# Patient Record
Sex: FEMALE | Race: WHITE | NOT HISPANIC OR LATINO | Employment: UNEMPLOYED | URBAN - NONMETROPOLITAN AREA
[De-identification: names, ages, dates, MRNs, and addresses within clinical notes are randomized per-mention and may not be internally consistent; named-entity substitution may affect disease eponyms.]

---

## 2017-01-26 ENCOUNTER — HISTORY (OUTPATIENT)
Dept: FAMILY MEDICINE | Facility: OTHER | Age: 37
End: 2017-01-26

## 2017-01-27 ENCOUNTER — OFFICE VISIT - GICH (OUTPATIENT)
Dept: FAMILY MEDICINE | Facility: OTHER | Age: 37
End: 2017-01-27

## 2017-01-27 ENCOUNTER — HISTORY (OUTPATIENT)
Dept: FAMILY MEDICINE | Facility: OTHER | Age: 37
End: 2017-01-27

## 2017-01-27 DIAGNOSIS — D69.6 THROMBOCYTOPENIA (H): ICD-10-CM

## 2017-01-27 LAB
ABSOLUTE BASOPHILS - HISTORICAL: 0 THOU/CU MM
ABSOLUTE EOSINOPHILS - HISTORICAL: 0 THOU/CU MM
ABSOLUTE LYMPHOCYTES - HISTORICAL: 1.4 THOU/CU MM (ref 0.9–2.9)
ABSOLUTE MONOCYTES - HISTORICAL: 0.4 THOU/CU MM
ABSOLUTE NEUTROPHILS - HISTORICAL: 3.1 THOU/CU MM (ref 1.7–7)
BASOPHILS # BLD AUTO: 0.6 %
EOSINOPHIL NFR BLD AUTO: 0.9 %
ERYTHROCYTE [DISTWIDTH] IN BLOOD BY AUTOMATED COUNT: 11 % (ref 11.5–15.5)
HCT VFR BLD AUTO: 44.1 % (ref 33–51)
HEMOGLOBIN: 14.5 G/DL (ref 12–16)
LYMPHOCYTES NFR BLD AUTO: 27.7 % (ref 20–44)
MCH RBC QN AUTO: 32 PG (ref 26–34)
MCHC RBC AUTO-ENTMCNC: 32.8 G/DL (ref 32–36)
MCV RBC AUTO: 97 FL (ref 80–100)
MONOCYTES NFR BLD AUTO: 7.5 %
NEUTROPHILS NFR BLD AUTO: 63.4 % (ref 42–72)
PLATELET # BLD AUTO: 127 THOU/CU MM (ref 140–440)
PMV BLD: 13.9 FL (ref 6.5–11)
RED BLOOD COUNT - HISTORICAL: 4.53 MIL/CU MM (ref 4–5.2)
WHITE BLOOD COUNT - HISTORICAL: 4.9 THOU/CU MM (ref 4.5–11)

## 2017-01-27 ASSESSMENT — PATIENT HEALTH QUESTIONNAIRE - PHQ9: SUM OF ALL RESPONSES TO PHQ QUESTIONS 1-9: 0

## 2017-02-07 LAB — HPV RESULTS - HISTORICAL: NEGATIVE

## 2017-05-22 ENCOUNTER — OFFICE VISIT - GICH (OUTPATIENT)
Dept: FAMILY MEDICINE | Facility: OTHER | Age: 37
End: 2017-05-22

## 2017-05-22 ENCOUNTER — HISTORY (OUTPATIENT)
Dept: FAMILY MEDICINE | Facility: OTHER | Age: 37
End: 2017-05-22

## 2017-05-22 DIAGNOSIS — R22.2 LOCALIZED SWELLING, MASS AND LUMP, TRUNK: ICD-10-CM

## 2017-05-24 ENCOUNTER — HOSPITAL ENCOUNTER (OUTPATIENT)
Dept: RADIOLOGY | Facility: OTHER | Age: 37
End: 2017-05-24
Attending: FAMILY MEDICINE

## 2017-05-24 ENCOUNTER — AMBULATORY - GICH (OUTPATIENT)
Dept: FAMILY MEDICINE | Facility: OTHER | Age: 37
End: 2017-05-24

## 2017-05-24 DIAGNOSIS — R22.2 LOCALIZED SWELLING, MASS AND LUMP, TRUNK: ICD-10-CM

## 2017-05-24 DIAGNOSIS — K43.9 VENTRAL HERNIA WITHOUT OBSTRUCTION OR GANGRENE: ICD-10-CM

## 2017-06-09 ENCOUNTER — OFFICE VISIT - GICH (OUTPATIENT)
Dept: SURGERY | Facility: OTHER | Age: 37
End: 2017-06-09

## 2017-06-09 ENCOUNTER — HISTORY (OUTPATIENT)
Dept: SURGERY | Facility: OTHER | Age: 37
End: 2017-06-09

## 2017-06-09 DIAGNOSIS — K43.9 VENTRAL HERNIA WITHOUT OBSTRUCTION OR GANGRENE: ICD-10-CM

## 2017-11-10 ENCOUNTER — COMMUNICATION - GICH (OUTPATIENT)
Dept: SURGERY | Facility: OTHER | Age: 37
End: 2017-11-10

## 2017-11-13 ENCOUNTER — COMMUNICATION - GICH (OUTPATIENT)
Dept: SURGERY | Facility: OTHER | Age: 37
End: 2017-11-13

## 2017-12-22 ENCOUNTER — SURGERY (OUTPATIENT)
Dept: SURGERY | Facility: OTHER | Age: 37
End: 2017-12-22

## 2017-12-22 ENCOUNTER — HOSPITAL ENCOUNTER (OUTPATIENT)
Dept: SURGERY | Facility: OTHER | Age: 37
Discharge: HOME | End: 2017-12-22
Attending: SURGERY | Admitting: SURGERY

## 2017-12-22 ENCOUNTER — HISTORY (OUTPATIENT)
Dept: SURGERY | Facility: OTHER | Age: 37
End: 2017-12-22

## 2017-12-22 DIAGNOSIS — K43.9 VENTRAL HERNIA WITHOUT OBSTRUCTION OR GANGRENE: ICD-10-CM

## 2017-12-22 LAB — HCG UR QL: NEGATIVE

## 2017-12-27 NOTE — PROGRESS NOTES
Patient Information     Patient Name MRN Sex     Kya Herring [id number] Female 1980      Progress Notes by Dejah Dominguez MD at 2017  9:50 AM     Author:  Dejah Dominguez MD Service:  (none) Author Type:  Physician     Filed:  2017 12:30 PM Encounter Date:  2017 Status:  Signed     :  Dejah Dominguez MD (Physician)            OFFICE CONSULTATION NOTE  Patient Name: Kya Herring  Address: [address] [place] [id number]  Age:37 y.o.  Sex: female     Primary Care Physician: Catherine Sanders MD    I was requested to see this patient in consultation by Catherine Sanders MD for evaluation of upper abdomen bulge. A copy of my findings and recommendations will be sent to Catherine Sanders MD.    HPI:   The patient is 37 y.o. female with upper abdomen bulge and pain. The patient first noted this with her 3rd pregnancy but noted that after her 4th pregnancy it was bigger. The bulge has been getting more uncomfortable but isn't really painful. No nausea or vomiting. No problems with new diarrhea or constipation. No skin redness or rash at the umbilicus. No previous hernia surgery.    CONSULTATION ASSESSMENT AND PLAN/RECOMMENDATIONS: I discussed with the patient the pathophysiology of ventral hernias. I explained the risks, benefits and alternatives to repair of ventral hernia with mesh. We specifically discussed the risks of infection, bleeding, injury to intra-abdominal organs, mesh complications and hernia recurrence. We discussed post operative limitations and expected recovery time. The patient's questions were answered and the patient wishes to proceed with repair. Informed consent paperwork was completed. This will be scheduled at a time that is convenient for the patient. The patient will call with questions or concerns prior to the procedure.      REVIEW OF SYSTEMS  GENERAL: No fevers or chills. Denies fatigue, recent weight loss.  HEENT: No sinus drainage.  No changes with vision or hearing. No difficulty swallowing.   LYMPHATICS:  No swollen nodes in axilla, neck or groin.  CARDIOVASCULAR: Denies chest pain, palpitations and dyspnea on exertion.  PULMONARY: No shortness of breath or cough. No increase in sputum production.  GI: Denies melena, bright red blood in stools. No hematemesis. No constipation or diarrhea.  : No dysuria or hematuria.  SKIN: No recent rashes or ulcers.   HEMATOLOGY:  No history of easy bruising or bleeding.  ENDOCRINE:  No history of diabetes or thyroid problems.  NEUROLOGY:  No history of seizures or headaches. No motor or sensory changes.    PAST MEDICAL HISTORY  Past Medical History:     Diagnosis  Date     History of pregnancy     P3-0-0-3,  x 3       PAST SURGICAL HISTORY  Past Surgical History:      Procedure  Laterality Date     [place] REMOVAL ANAL FISTULA SUBCUTANEOUS  2015    Dr Rodrigues       VAGINAL DELIVERY  2016             CURRENT MEDS  Current Outpatient Prescriptions on File Prior to Visit       Medication  Sig Dispense Refill     calcium 600 mg capsule Take 1 capsule by mouth 2 times daily with meals.  0     prenatal vitamin-folic acid 1 mg (PRENATAL VITAMIN) tablet/capsule Take 1 tablet by mouth once daily.  0     No current facility-administered medications on file prior to visit.      ALLERGIES/SENSITIVITIES  Allergies     Allergen  Reactions     Penicillins Hives     FAMILY HISTORY  Family History       Problem   Relation Age of Onset     Heart Disease  Mother      Heart valve replaced.Congenital heart disease       Good Health  Other      She has four siblings; all well.        Cancer  Father      skin cancer       Cancer  Paternal Aunt      skin cancer       No Known Problems  Son      No Known Problems  Son      No Known Problems  Son      No Known Problems  Daughter       SOCIAL HISTORY  Social History     Social History        Marital status:       Spouse name: Juan José     Number of children:  4     Years  "of education:  12+     Occupational History        STAY-AT-HOME MOM       part-time at [employer]      Social History Main Topics        Smoking status:  Never Smoker     Smokeless tobacco:  Never Used     Alcohol use  No     Drug use:  No     Sexual activity:  Yes     Partners: Male     Other Topics  Concern     Not on file      Social History Narrative     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     Clifford Spouse    Michoacano Son 09/12/04    Juan Francisco Son 03/28/07    Yash        Son 2/9/10    [facility] 12/2016                  PHYSICAL EXAM  /62  Ht 1.702 m (5' 7\")  Wt 62.4 kg (137 lb 8 oz)  LMP 04/17/2017 (Approximate)  Breastfeeding? Yes  BMI 21.54 kg/m2   Body mass index is 21.54 kg/(m^2).    GENERAL: Healthy appearing patient in no acute distress. Pleasant and cooperative with exam and interview.   HEENT: Head-normocephalic. Eyes-no scleral icterus, pupils equal, round, and reactive to light. Nose-no nasal drainage. No lesions. Mouth-oral mucosa pink and moist, no lesions.  NECK: Supple. No thyroid nodules. Trachea midline.  LYMPHATICS:  No cervical, axillary or supraclavicular adenopathy.  CV: Regular rate and rhythm, no murmurs. No peripheral edema.  LUNGS:  No respiratory distress. Clear bilaterally to auscultation.  ABDOMEN: Non distended. Bowel sounds active. Soft, non-tender, no hepatosplenomegaly. Mid abdomen ventral hernia noted, minimal tenderness, reducible. No peritoneal signs.  SKIN: Pink, warm and dry. No jaundice. No rash.  NEURO:  Cranial nerves II-XII grossly intact. Alert and oriented.  PSYCH: Appropriate mood and affect.      Dejah Dominguez MD         "

## 2017-12-28 NOTE — TELEPHONE ENCOUNTER
Patient Information     Patient Name MRN Kya Louis [id number] Female 1980      Telephone Encounter by Jeanette Quezada at 11/10/2017  1:37 PM     Author:  Jeanette Quezada Service:  (none) Author Type:  (none)     Filed:  11/10/2017  1:37 PM Encounter Date:  11/10/2017 Status:  Signed     :  Jeanette Quezada            Left message to call back  ....................  11/10/2017   1:37 PM  Jeanette Quezada LPN.......................... 11/10/2017  1:37 PM

## 2017-12-28 NOTE — TELEPHONE ENCOUNTER
Patient Information     Patient Name MRN Sex Kya Merlos [id number] Female 1980      Telephone Encounter by Ariane Huddleston at 2017  9:22 AM     Author:  Ariane Huddleston Service:  (none) Author Type:  (none)     Filed:  2017  9:24 AM Encounter Date:  2017 Status:  Signed     :  Ariane Huddleston            Spoke with patient she is wanting to schedule surgery, returning nurse's phone call, will route to surgery nurses. Ariane Huddleston LPN......................2017 9:23 AM

## 2017-12-28 NOTE — TELEPHONE ENCOUNTER
Patient Information     Patient Name MRN Sex Kya Merlos [id number] Female 1980      Telephone Encounter by Gabi Dubois at 2017  9:58 AM     Author:  Gabi Dubois Service:  (none) Author Type:  (none)     Filed:  2017 10:00 AM Encounter Date:  2017 Status:  Signed     :  Gabi Dubois            Patient scheduled for surgery on 17.  Surgery handbook reviewed over the phone.  Will pick this up along with dereje wipes at unit 3 registration.  Will call with any questions or concerns.  Gabi Dubois LPN..........2017  10:00 AM

## 2017-12-28 NOTE — TELEPHONE ENCOUNTER
Patient Information     Patient Name MRN Sex Kya Merlos [id number] Female 1980      Telephone Encounter by Hemalatha Plata at 11/10/2017  1:26 PM     Author:  Hemalatha Plata Service:  (none) Author Type:  (none)     Filed:  11/10/2017  1:27 PM Encounter Date:  11/10/2017 Status:  Signed     :  Hemalatha Plata            Patient called wanting to get set up for hernia repair with [facility].  She was looking to have it done in December.  Her consult was on 2017.  Please call her to schedule surgery.  Hemalatha Plata ....................  11/10/2017   1:26 PM

## 2017-12-28 NOTE — TELEPHONE ENCOUNTER
Patient Information     Patient Name MRN Sex Kya Merlos [id number] Female 1980      Telephone Encounter by Hemalatha Plata at 2017  1:05 PM     Author:  Hemalatha Plata Service:  (none) Author Type:  (none)     Filed:  2017  1:05 PM Encounter Date:  11/10/2017 Status:  Signed     :  Hemalatha Plata            Surgery scheduled with [facility] for 2017.  Post-op ..  Patient picked up surgery packet today.  Hemalatha Plata ....................  2017   1:05 PM

## 2017-12-29 NOTE — H&P
Patient Information     Patient Name MRN Sex     Kya Herring [id number] Female 1980      H&P by Dejah Dominguez MD at 2017  9:50 AM     Author:  Dejah Dominguez MD Service:  (none) Author Type:  Physician     Filed:  2017 12:30 PM Encounter Date:  2017 Status:  Signed     :  Dejah Dominguez MD (Physician)            OFFICE CONSULTATION NOTE  Patient Name: Kya Herring  Address: [address] [place] [id number]  Age:37 y.o.  Sex: female     Primary Care Physician: Catherine Sanders MD    I was requested to see this patient in consultation by Catherine Sanders MD for evaluation of upper abdomen bulge. A copy of my findings and recommendations will be sent to Catherine Sanders MD.    HPI:   The patient is 37 y.o. female with upper abdomen bulge and pain. The patient first noted this with her 3rd pregnancy but noted that after her 4th pregnancy it was bigger. The bulge has been getting more uncomfortable but isn't really painful. No nausea or vomiting. No problems with new diarrhea or constipation. No skin redness or rash at the umbilicus. No previous hernia surgery.    CONSULTATION ASSESSMENT AND PLAN/RECOMMENDATIONS: I discussed with the patient the pathophysiology of ventral hernias. I explained the risks, benefits and alternatives to repair of ventral hernia with mesh. We specifically discussed the risks of infection, bleeding, injury to intra-abdominal organs, mesh complications and hernia recurrence. We discussed post operative limitations and expected recovery time. The patient's questions were answered and the patient wishes to proceed with repair. Informed consent paperwork was completed. This will be scheduled at a time that is convenient for the patient. The patient will call with questions or concerns prior to the procedure.      REVIEW OF SYSTEMS  GENERAL: No fevers or chills. Denies fatigue, recent weight loss.  HEENT: No sinus drainage. No changes  with vision or hearing. No difficulty swallowing.   LYMPHATICS:  No swollen nodes in axilla, neck or groin.  CARDIOVASCULAR: Denies chest pain, palpitations and dyspnea on exertion.  PULMONARY: No shortness of breath or cough. No increase in sputum production.  GI: Denies melena, bright red blood in stools. No hematemesis. No constipation or diarrhea.  : No dysuria or hematuria.  SKIN: No recent rashes or ulcers.   HEMATOLOGY:  No history of easy bruising or bleeding.  ENDOCRINE:  No history of diabetes or thyroid problems.  NEUROLOGY:  No history of seizures or headaches. No motor or sensory changes.    PAST MEDICAL HISTORY  Past Medical History:     Diagnosis  Date     History of pregnancy     P3-0-0-3,  x 3       PAST SURGICAL HISTORY  Past Surgical History:      Procedure  Laterality Date     [place] REMOVAL ANAL FISTULA SUBCUTANEOUS  2015    Dr Rodrigues       VAGINAL DELIVERY  2016             CURRENT MEDS  Current Outpatient Prescriptions on File Prior to Visit       Medication  Sig Dispense Refill     calcium 600 mg capsule Take 1 capsule by mouth 2 times daily with meals.  0     prenatal vitamin-folic acid 1 mg (PRENATAL VITAMIN) tablet/capsule Take 1 tablet by mouth once daily.  0     No current facility-administered medications on file prior to visit.      ALLERGIES/SENSITIVITIES  Allergies     Allergen  Reactions     Penicillins Hives     FAMILY HISTORY  Family History       Problem   Relation Age of Onset     Heart Disease  Mother      Heart valve replaced.Congenital heart disease       Good Health  Other      She has four siblings; all well.        Cancer  Father      skin cancer       Cancer  Paternal Aunt      skin cancer       No Known Problems  Son      No Known Problems  Son      No Known Problems  Son      No Known Problems  Daughter       SOCIAL HISTORY  Social History     Social History        Marital status:       Spouse name: Juan José     Number of children:  4     Years of  "education:  12+     Occupational History        STAY-AT-HOME MOM       part-time at [employer]      Social History Main Topics        Smoking status:  Never Smoker     Smokeless tobacco:  Never Used     Alcohol use  No     Drug use:  No     Sexual activity:  Yes     Partners: Male     Other Topics  Concern     Not on file      Social History Narrative     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     Clifford Spouse    Michoacano Son 09/12/04    Juan Francisco Son 03/28/07    Yash        Son 2/9/10    [facility] 12/2016                  PHYSICAL EXAM  /62  Ht 1.702 m (5' 7\")  Wt 62.4 kg (137 lb 8 oz)  LMP 04/17/2017 (Approximate)  Breastfeeding? Yes  BMI 21.54 kg/m2   Body mass index is 21.54 kg/(m^2).    GENERAL: Healthy appearing patient in no acute distress. Pleasant and cooperative with exam and interview.   HEENT: Head-normocephalic. Eyes-no scleral icterus, pupils equal, round, and reactive to light. Nose-no nasal drainage. No lesions. Mouth-oral mucosa pink and moist, no lesions.  NECK: Supple. No thyroid nodules. Trachea midline.  LYMPHATICS:  No cervical, axillary or supraclavicular adenopathy.  CV: Regular rate and rhythm, no murmurs. No peripheral edema.  LUNGS:  No respiratory distress. Clear bilaterally to auscultation.  ABDOMEN: Non distended. Bowel sounds active. Soft, non-tender, no hepatosplenomegaly. Mid abdomen ventral hernia noted, minimal tenderness, reducible. No peritoneal signs.  SKIN: Pink, warm and dry. No jaundice. No rash.  NEURO:  Cranial nerves II-XII grossly intact. Alert and oriented.  PSYCH: Appropriate mood and affect.      Dejah Dominguez MD             "

## 2017-12-29 NOTE — PATIENT INSTRUCTIONS
Patient Information     Patient Name MRN Sex Kya Merlos [id number] Female 1980      Patient Instructions by Dejah Dominguez MD at 2017  9:50 AM     Author:  Dejah Dominguez MD Service:  (none) Author Type:  Physician     Filed:  2017 10:07 AM Encounter Date:  2017 Status:  Signed     :  Dejah Dominguez MD (Physician)            Call me if you have pain or when you're ready to schedule.

## 2017-12-30 NOTE — NURSING NOTE
Patient Information     Patient Name MRN Sex Kya Merlos [id number] Female 1980      Nursing Note by Gabi Dubois at 2017  9:50 AM     Author:  Gabi Dubois Service:  (none) Author Type:  (none)     Filed:  2017  9:47 AM Encounter Date:  2017 Status:  Signed     :  Gabi Dubois            Here today in consultation for a ventral hernia.  Gabi Dubois LPN..........2017  9:45 AM

## 2018-01-03 NOTE — PROGRESS NOTES
Patient Information     Patient Name MRN Sex     Kya Herring [id number] Female 1980      Progress Notes by Catherine Sanders MD at 2017 11:15 AM     Author:  Catherine Sanders MD Service:  (none) Author Type:  Physician     Filed:  2017 12:02 PM Encounter Date:  2017 Status:  Signed     :  Catherine Sanders MD (Physician)            POST PARTUM OFFICE VISIT - OB  Kya Herring is a 36 y.o. female here for 6 week post partum check.  Pregnancy complicated by gestational thrombocytopenia.      Allergies: Penicillins    Current Medications:  Current Outpatient Rx       Medication  Sig Dispense Refill     Breast Pump - Purchase For home use. Manual pump 1 unit 0     calcium 600 mg capsule Take 1 capsule by mouth 2 times daily with meals.  0     prenatal vitamin-folic acid 1 mg (PRENATAL VITAMIN) tablet/capsule Take 1 tablet by mouth once daily.  0     Medications have been reviewed by me and are current to the best of my knowledge and ability.      Antepartum Issues: normal antepartum course  Estimate Date of Confinement (EDC):  16  Date of Delivery:  16  Type of Delivery:  induced vaginal  Episiotomy/Lacerations:  none  Complications:  none    INFANT  Infant #1 Nivayah  Gender female, Current Status normal activity, mood and playfulness, normal appetite, normal fluid intake, Feeding Method breast feeding    MOTHER  Mood:  does not indicate change in mood since delivery    Energy:  has returned to normal preconception energy level  Appetite:  normal appetite  Calcium Intake:  not currently taking a calcium supplement  Breasts:  normal, lactating breast(s)   Bowel Movements:  no complaints of bowel problems or changes in bowel habits    LMP: Patient's last menstrual period was 2016 (exact date).  Menstrual Cycle/Flow:  Is not applicable and described as not applicable.    Resumption of Sexual Activity:  no  Current Type of Contraception:   "none  Previous Type of Contraception:  natural family planning    ROS  History of perirectal cyst - had surgery prior to pregnancy    PHYSICAL EXAM  Visit Vitals       /62     Pulse 60     Temp 96.2  F (35.7  C) (Tympanic)     Ht 1.715 m (5' 7.5\")     Wt 68 kg (150 lb)     LMP 03/06/2016 (Exact Date)     BMI 23.15 kg/m2     General Appearance:  Alert, appropriate appearance for age. No acute distress  HEENT Exam:  Grossly normal.  Neck / Thyroid Exam:  Supple, no masses, nodes or enlargement.  Chest/Respiratory Exam: Normal chest wall and respirations. Clear to auscultation.  Cardiovascular Exam: Regular rate and rhythm. S1, S2, no murmur, click, gallop, or rubs.  Gastrointestinal Exam: Soft, non-tender, no masses or organomegaly.  Pelvic Exam Female: Vulva and vagina appear normal. Bimanual exam reveals normal uterus and adnexa. Pap done  Rectovaginal Exam: scar from surgery  Lymphatic Exam: Non-palpable nodes in neck, clavicular, axillary, or inguinal regions.  Musculoskeletal Exam: Back is straight and non-tender, full ROM of upper and lower extremities.  Skin: no rash or abnormalities  Neurologic Exam: Normal gait and speech, no tremor.  Psychiatric Exam: Alert and oriented, appropriate affect.  Fundus:  uterine size is normal, non-tender, fundal height 6 cm  Lochia:  rubra   Perineum:  Scar from her surgery      ASSESSMENT  Healthy, 36 y.o. female, G 4, P 4, 6 weeks postpartum.  1. Postpartum care and examination    2. THROMBOCYTOPENIA         PLAN  Pap and HPV done.  Probable vasectomy for her   Platelets to be rechecked         "

## 2018-01-03 NOTE — NURSING NOTE
Patient Information     Patient Name MRN Kya Louis [id number] Female 1980      Nursing Note by Guerline Hairston at 2017 11:15 AM     Author:  Guerline Hairston Service:  (none) Author Type:  (none)     Filed:  2017 11:21 AM Encounter Date:  2017 Status:  Signed     :  Guerline Hairston            Patient presents to the clinic for 6 week postpartum care.  Patient denies any concerns at this time.    Guerline Hairston LPN        2017 11:14 AM

## 2018-01-03 NOTE — ADDENDUM NOTE
Patient Information     Patient Name MRN Kya Louis [id number] Female 1980      Addendum Note by Trixie Soriano at 2017  1:55 PM     Author:  Trixie Soriano Service:  (none) Author Type:  (none)     Filed:  2017  1:55 PM Encounter Date:  2017 Status:  Signed     :  Trixie Soriano       Addended by: TRIXIE SORIANO on: 2017 01:55 PM        Modules accepted: Orders

## 2018-01-03 NOTE — PROGRESS NOTES
Patient Information     Patient Name MRN Kya Louis [id number] Female 1980      Progress Notes by Catherine Sanders MD at 2017 12:48 PM     Author:  Catherine Sanders MD Service:  (none) Author Type:  Physician     Filed:  2017 12:48 PM Encounter Date:  2017 Status:  Signed     :  Catherine Sanders MD (Physician)            Please call.    Platelets are up to 127,000 - the highest they've been in a year  Catherine Sanders MD ....................  2017   12:48 PM

## 2018-01-05 ENCOUNTER — HISTORY (OUTPATIENT)
Dept: SURGERY | Facility: OTHER | Age: 38
End: 2018-01-05

## 2018-01-05 ENCOUNTER — OFFICE VISIT - GICH (OUTPATIENT)
Dept: SURGERY | Facility: OTHER | Age: 38
End: 2018-01-05

## 2018-01-05 DIAGNOSIS — K43.9 VENTRAL HERNIA WITHOUT OBSTRUCTION OR GANGRENE: ICD-10-CM

## 2018-01-05 DIAGNOSIS — Z09 ENCOUNTER FOR FOLLOW-UP EXAMINATION AFTER COMPLETED TREATMENT FOR CONDITIONS OTHER THAN MALIGNANT NEOPLASM: ICD-10-CM

## 2018-01-05 NOTE — NURSING NOTE
Patient Information     Patient Name MRN Sex Kya Merlos [id number] Female 1980      Nursing Note by Radha Iqbal at 2017  9:30 AM     Author:  Radha Iqbal Service:  (none) Author Type:  (none)     Filed:  2017  9:54 AM Encounter Date:  2017 Status:  Signed     :  Radha Iqbal            Patient is here to have a lump on abdomen checked. States has been there for a long time but forgot about it during pregnancy  because she couldn't feel like. States is located about belly button.  Radha Iqbal LPN .............2017  9:25 AM

## 2018-01-05 NOTE — PROGRESS NOTES
Patient Information     Patient Name MRN Sex Kya Merlos [id number] Female 1980      Progress Notes by Catherine Sanders MD at 2017 12:45 PM     Author:  Catherine Sanders MD Service:  (none) Author Type:  Physician     Filed:  2017 12:45 PM Date of Service:  2017 12:45 PM Status:  Signed     :  Catherine Sanders MD (Physician)            Please call.    The ultrasound does show  Hernia - surgery consult recommended.  Order is placed.  Catherine Sanders MD ....................  2017   12:44 PM

## 2018-01-05 NOTE — PROGRESS NOTES
Patient Information     Patient Name MRN Sex Kya Merlos [id number] Female 1980      Progress Notes by Catherine Sanders MD at 2017  9:30 AM     Author:  Catherine Sanders MD Service:  (none) Author Type:  Physician     Filed:  2017  9:55 AM Encounter Date:  2017 Status:  Signed     :  Catherine Sanders MD (Physician)            SUBJECTIVE:    Kya Hrering is a 36 y.o. female who presents for evaluation of a lump on her stomach.  There are no exam notes on file for this visit.       HPI  Kya Herring is a 36 y.o. female in for evaluation of a lump on her abdomen. She noticed this more than a year ago, prior to becoming pregnant with her 5-month-old daughter. During pregnancy, it was not as noticeable. She does not think his grown or changed. Is not tender to touch. States her father has a hernia, she was worried that it could be this. Is becoming visible on her abdomen. It is not really sore or tender to touch. No redness. No previous abdominal surgeries.    Allergies     Allergen  Reactions     Penicillins Hives   ,   Current Outpatient Prescriptions     Medication  Sig     Breast Pump - Purchase For home use. Manual pump     calcium 600 mg capsule Take 1 capsule by mouth 2 times daily with meals.     prenatal vitamin-folic acid 1 mg (PRENATAL VITAMIN) tablet/capsule Take 1 tablet by mouth once daily.     No current facility-administered medications for this visit.      Medications have been reviewed by me and are current to the best of my knowledge and ability. ,   Past Medical History:     Diagnosis  Date     History of pregnancy     P3-0-0-3,  x 3     and   Past Surgical History:      Procedure  Laterality Date     [place] REMOVAL ANAL FISTULA SUBCUTANEOUS  2015    Dr Rodrigues       VAGINAL DELIVERY  2016              REVIEW OF SYSTEMS:  ROS    OBJECTIVE:  /72  Pulse 90  Temp 98.1  F (36.7  C) (Tympanic)  Wt 63 kg (139 lb)  LMP  04/17/2017 (Approximate)  BMI 21.45 kg/m2    EXAM:   Physical Exam   Constitutional: She is well-developed, well-nourished, and in no distress.   Abdominal:   Just to the right of midline of her upper abdomen is a 3 cm smooth nontender nodule. This is best palpable with patient standing.   Vitals reviewed.      ASSESSMENT/PLAN:    ICD-10-CM    1. Abdominal wall lump R22.2 US ABDOMEN LIMITED        Plan:  1. Differential diagnosis is discussed including lipoma, sebaceous cyst. Hernia is possible, but less likely. We'll obtain an ultrasound and depending upon results, consider surgical referral for removal/repair.  Catherine Sanders MD

## 2018-01-05 NOTE — PATIENT INSTRUCTIONS
Patient Information     Patient Name MRN Sex Kya Merlos [id number] Female 1980      Patient Instructions by Catherine Sanders MD at 2017  9:30 AM     Author:  Catherine Sanders MD Service:  (none) Author Type:  Physician     Filed:  2017  9:35 AM Encounter Date:  2017 Status:  Signed     :  Catherine Sanders MD (Physician)             Ultrasound to evaluate:  If hernia - recommend having this repaired  If sebaceous cyst or lipoma - you would not have to do anything unless it bothered you

## 2018-01-26 VITALS
SYSTOLIC BLOOD PRESSURE: 106 MMHG | BODY MASS INDEX: 21.45 KG/M2 | HEART RATE: 90 BPM | DIASTOLIC BLOOD PRESSURE: 72 MMHG | WEIGHT: 139 LBS | TEMPERATURE: 98.1 F

## 2018-01-26 VITALS
SYSTOLIC BLOOD PRESSURE: 104 MMHG | DIASTOLIC BLOOD PRESSURE: 62 MMHG | WEIGHT: 150 LBS | DIASTOLIC BLOOD PRESSURE: 62 MMHG | HEIGHT: 68 IN | WEIGHT: 137.5 LBS | BODY MASS INDEX: 22.73 KG/M2 | HEIGHT: 67 IN | HEART RATE: 60 BPM | SYSTOLIC BLOOD PRESSURE: 108 MMHG | TEMPERATURE: 96.2 F | BODY MASS INDEX: 21.58 KG/M2

## 2018-02-01 ASSESSMENT — PATIENT HEALTH QUESTIONNAIRE - PHQ9: SUM OF ALL RESPONSES TO PHQ QUESTIONS 1-9: 0

## 2018-02-09 VITALS
HEART RATE: 80 BPM | SYSTOLIC BLOOD PRESSURE: 118 MMHG | BODY MASS INDEX: 21.61 KG/M2 | DIASTOLIC BLOOD PRESSURE: 72 MMHG | WEIGHT: 138 LBS

## 2018-02-13 NOTE — NURSING NOTE
Patient Information     Patient Name MRN Sex Kya Merlos [id number] Female 1980      Nursing Note by Jeanette Quezada at 2018 10:20 AM     Author:  Jeanette Quezada Service:  (none) Author Type:  (none)     Filed:  2018 10:25 AM Encounter Date:  2018 Status:  Signed     :  Jeanette Quezada            Patient is here today for a post op from a hernia repair.  Jeanette Quezada LPN.......................... 2018  10:21 AM

## 2018-02-13 NOTE — PROGRESS NOTES
Patient Information     Patient Name MRN Sex Kya Merlos [id number] Female 1980      Progress Notes by Dejah Dominguez MD at 2018 10:20 AM     Author:  Dejah Dominguez MD Service:  (none) Author Type:  Physician     Filed:  2018 11:05 AM Encounter Date:  2018 Status:  Signed     :  Dejah Dominguez MD (Physician)            Patient presents for post surgical visit after ventral hernia repair with mesh on . Patient has done well. No problems with incision. She did have an itchy rash in a square on her abdomen, that has resolved. She only took 1/2 pain pill 2 nights.     /72 (Cuff Site: Right Arm, Position: Sitting, Cuff Size: Adult Regular)  Pulse 80  Wt 62.6 kg (138 lb)  LMP 2017  Breastfeeding? No  BMI 21.61 kg/m2    General: NAD, pleasant and cooperative with exam and interview.  Abdomen: healing incision. No sign of infection. No pain with palpation. Healing ridge palpable  Psychiatry: awake, alert and oriented. Appropriate affect.    Assessment/Plan:  Discussed surgery. Chlorhexidine added to allergies. Patient can return to normal activities. Patient will call with questions or concerns.

## 2018-02-23 NOTE — PROCEDURES
Patient Information     Patient Name MRN Sex Kya Merlos [id number] Female 1980      Procedures by Dejah Dominguez MD at 2017  1:47 PM     Author:  Dejah Dominguez MD Service:  (none) Author Type:  Physician     Filed:  2017  2:02 PM Date of Service:  2017  1:47 PM Status:  Signed     :  Dejah Dominguez MD (Physician)        Pre-procedure Diagnoses:    1. Ventral hernia without obstruction or gangrene [K43.9]           Post-procedure Diagnoses:    1. Ventral hernia without obstruction or gangrene [K43.9]           Procedures:    1. GA REPAIR INCISIONAL HERNIA REDUCIBLE [08000.0]    2. GA REPAIR HERNIA W MESH [52432.0]               Preoperative Diagnosis: midline Ventral Hernia   Postoperative Diagnosis: midline Ventral Hernia   Procedure planned: Repair ventral hernia with mesh   Procedure performed: Repair reducible ventral hernia with mesh   Surgeon: Dejah Dominguez MD   Circulator: Shani Marroquin RN  Pre Op Nurse: Leigha Jama RN  Phase II Nurse: Tawana Gastelum RN  Scrub: Obed Thompson CST  Scrub Orientee: Mame Rivers, Surgical Technician  Anesthesia: monitored anesthesia care, local   Specimen: none  Estimated Blood Loss: minimal   INDICATIONS   Please see the H&P. The patient has been having discomfort associated with a bulge in the mid upper abdomen. The risks, benefits and alternatives to repair of ventral hernia with mesh were discussed with the patient. We specifically discussed the risks of infection, bleeding, injury to abdominal organs, mesh complication and hernia recurrence. The patient expressed understanding and questions were answered. Informed consent paperwork was completed.     DESCRIPTION OF PROCEDURE   The patient was brought to the operating room and placed in a supine position on the operating table. Appropriate monitors were attached.  The patient received IV antibiotics preoperatively. After general anesthesia was induced, the patient was  positioned, prepped and draped in the standard fashion. Time out was performed confirming the patient's identity and procedure to be performed.  Local anesthetic was infiltrated in the skin and subcutaneous tissue in the area of planned incision over the bulge/defect. Incision was made sharply and carried down to the subcutaneous tissue. Electrocautery was used to maintain excellent hemostasis. Dissection was carried out to the level of the fascia. The hernia sac was dissected from from the overlying skin. The hernia sac was opened and no incarcerated contents were noted. The sac was reduced. Circular layered mesh was placed and secured with Vicryl sutures utilizing the straps. Hemostasis was excellent. Further local anesthetic was infiltrated for post operative pain control. Skin edges were approximated using Monocryl suture. Sterile dressing was applied. The patient was then awakened from anesthesia and taken to postanesthesia recovery in stable condition. All needle, sponge and instrument counts were reported as correct at the conclusion of the case. The patient tolerated the procedure with no immediately apparent complications.     Dejah Dominguez MD     CC: Catherine Sanders MD

## 2018-02-23 NOTE — OR PREOP
Patient Information     Patient Name MRN Sex Kya Merlos [id number] Female 1980      OR PreOp by Leigha Jama RN at 2017 12:45 PM     Author:  Leigha Jama RN Service:  (none) Author Type:  NURS- Registered Nurse     Filed:  2017  1:00 PM Date of Service:  2017 12:45 PM Status:  Signed     :  Leigha Jama RN (NURS- Registered Nurse)            Handoff report given to Shani Marroquin RN prior to patient transfer to OR.

## 2018-02-23 NOTE — OR NURSING
Patient Information     Patient Name MRN Sex Kya Merlos [id number] Female 1980      OR Nursing by Shani Marroquin RN at 2017  1:25 PM     Author:  Shani Marroquin RN Service:  (none) Author Type:  NURS- Registered Nurse     Filed:  2017  1:25 PM Date of Service:  2017  1:25 PM Status:  Signed     :  Shani Marroquin RN (NURS- Registered Nurse)            Received preop report from Leigha Jama RN.

## 2018-02-23 NOTE — OR ANESTHESIA
Patient Information     Patient Name MRN Sex     Kya Herring [id number] Female 1980      OR Anesthesia by Devyn Jauregui CRNA at 2017  2:44 PM     Author:  Devyn Jauregui CRNA Service:  (none) Author Type:  NURS- Nurse Anesthetist     Filed:  2017  2:44 PM Date of Service:  2017  2:44 PM Status:  Signed     :  Devyn Jauregui CRNA (NURS- Nurse Anesthetist)            Anesthesia Post Operative Care Note    Name: Kya Herring  MRN:   [id number]  :    1980       Procedure Done:  See Surgeon Note   Case Cancelled for Anesthetic Reason:  No      Anesthesia Technique    Anesthetic Type:  MAC       MAC Type:  NC     Oral Trauma:  No    Intraoperative Course   Hemodynamics:  Stable    Ventilation Normal:  Yes Lung Sounds:  Normal      PACU Course        Nondepolarizer Used:       Reversed: N/A   Hemodynamics:  Stable      Hydration: Euvolemic   Temperature:  36.1 - 38.3      Mental Status:  Awake, alert, follows commands   Pain Management:  Adequate   Regional Block:  No   Anesthesia Complications:  None      Vital Signs:  Temp: 97.2  F (36.2  C)  Pulse: 65  BP: 112/57  Resp: 16  SpO2: 96 %    O2 Device: Room Air         Level of Nausea: None        Active Lines:  Patient Lines/Drains/Airways Status    Active Line     Name: Placement date: Placement time: Site: Days:    PERIPHERAL VAD Left Forearm 20 17   1135   Forearm   less than 1                Intake & Output:       Labs:  No results for input(s): DW9QPJOJMVP, FNA6ZRTMBOFK, PHARTERIAL, FKW3PZXOBZRX, C2YESSEWOBEK in the last 24 hours.    No results for input(s): MAGNESIUM in the last 24 hours.    No results for input(s): GLUCOSEMETER in the last 720 hours.        Devyn Jauregui CRNA ....................  2017   2:44 PM

## 2018-02-23 NOTE — OR ANESTHESIA
Patient Information     Patient Name MRN Sex     Kya Herring [id number] Female 1980      OR Anesthesia by Devyn Jauregui CRNA at 2017 12:03 PM     Author:  Devyn Jauregui CRNA Service:  (none) Author Type:  NURS- Nurse Anesthetist     Filed:  2017 12:03 PM Date of Service:  2017 12:03 PM Status:  Signed     :  Devyn Jauregui CRNA (NURS- Nurse Anesthetist)            ANESTHESIAPREOP      PREANESTHETIC EXAM    Kya Herring is a 37 y.o. female    /69 (Cuff Size: Adult Regular)  Pulse 72  Temp 98.4  F (36.9  C)  Resp 18  LMP Comment: neg hcg 17  SpO2 99%  Breastfeeding? Yes  There is no height or weight on file to calculate BMI.    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        Cancer  Father      skin cancer       Cancer  Paternal Aunt      skin cancer       No Known Problems  Son      No Known Problems  Son      No Known Problems  Son      No Known Problems  Daughter        Past Surgical History:      Procedure  Laterality Date     [place] REMOVAL ANAL FISTULA SUBCUTANEOUS  2015    Dr Rodrigues       VAGINAL DELIVERY  2016              Major Anesthetic Reactions: none    PMH/PSH Reviewed      History    Smoking Status      Never Smoker   Smokeless Tobacco      Never Used     History    Alcohol Use No       Medications have been reviewed in coordination with proposed intra-procedure medications.    Prescriptions  Prior to Admission       Medication  Sig Dispense Refill     calcium 600 mg capsule Take 1 capsule by mouth 2 times daily with meals.  0     prenatal vitamin-folic acid 1 mg (PRENATAL VITAMIN) tablet/capsule Take 1 tablet by mouth once daily.  0       Recent Labs  Results for orders placed or performed during the hospital encounter of 12/22/17       Pregnancy Urine        Result  Value Ref Range Status    PREGNANCY,URINE           Negative Negative Final       NPO Status Noted:  Yes    Airway Class:  2    ASA Physical Status: 2    ANESTHETIC PLAN    Anesthetic Plan: Mac    The risks, benefits, and alternatives of the procedure were discussed.    Postop Note: Please see the chart for the postop note.    Devyn Jauregui CRNA ....................  12/22/2017   12:03 PM

## 2018-02-23 NOTE — H&P
Patient Information     Patient Name MRN Sex     Kya Herring [id number] Female 1980      H&P by Dejah Dominguez MD at 2017 12:40 PM     Author:  Dejah Dominguez MD Service:  (none) Author Type:  Physician     Filed:  2017 12:46 PM Date of Service:  2017 12:40 PM Status:  Signed     :  Dejah Dominguez MD (Physician)            OFFICE CONSULTATION NOTE  Patient Name: Kya Herring  Address: [address] [place] [id number]  Age:37 y.o.  Sex: female     Primary Care Physician: Catherine Sanders MD    I was requested to see this patient in consultation by Catherine Sanders MD for evaluation of umbilical pain/bulge. A copy of my findings and recommendations will be sent to Catherine Sanders MD.    HPI:   The patient is 37 y.o. female with upper mid abdomen bulge and discomfort. The patient first noted this with her 3rd pregnancy and it got bigger with her 4th pregnancy. The bulge has been getting more uncomfortable. No nausea or vomiting. No problems with new diarrhea or constipation. No skin redness or rash. No previous hernia surgery.    CONSULTATION ASSESSMENT AND PLAN/RECOMMENDATIONS: I discussed with the patient the pathophysiology of ventral hernias. I explained the risks, benefits and alternatives to repair of ventral hernia with mesh. We specifically discussed the risks of infection, bleeding, injury to intra-abdominal organs, mesh complications and hernia recurrence. We discussed post operative limitations and expected recovery time. The patient's questions were answered and the patient wishes to proceed with repair. Informed consent paperwork was completed.    REVIEW OF SYSTEMS  GENERAL: No fevers or chills. Denies fatigue, recent weight loss.  HEENT: No sinus drainage. No changes with vision or hearing. No difficulty swallowing.   LYMPHATICS:  No swollen nodes in axilla, neck or groin.  CARDIOVASCULAR: Denies chest pain, palpitations and dyspnea on  exertion.  PULMONARY: No shortness of breath or cough. No increase in sputum production.  GI: Denies melena, bright red blood in stools. No hematemesis. No constipation or diarrhea.  : No dysuria or hematuria.  SKIN: No recent rashes or ulcers.   HEMATOLOGY:  No history of easy bruising or bleeding.  ENDOCRINE:  No history of diabetes or thyroid problems.  NEUROLOGY:  No history of seizures or headaches. No motor or sensory changes.    PAST MEDICAL HISTORY    Past Medical History:     Diagnosis  Date     History of pregnancy       PAST SURGICAL HISTORY    Past Surgical History:      Procedure  Laterality Date     [place] REMOVAL ANAL FISTULA SUBCUTANEOUS  5/2015    Dr Rodrigues       VAGINAL DELIVERY  12/16/2016             CURRENT MEDS    No current facility-administered medications on file prior to encounter.      Current Outpatient Prescriptions on File Prior to Encounter       Medication  Sig Dispense Refill     calcium 600 mg capsule Take 1 capsule by mouth 2 times daily with meals.  0     prenatal vitamin-folic acid 1 mg (PRENATAL VITAMIN) tablet/capsule Take 1 tablet by mouth once daily.  0     ALLERGIES/SENSITIVITIES  Allergies     Allergen  Reactions     Penicillins Hives     FAMILY HISTORY    Family History       Problem   Relation Age of Onset     Heart Disease  Mother      Heart valve replaced.Congenital heart disease       Good Health  Other      She has four siblings; all well.        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     Clifford Spouse    Michoacano Son 09/12/04    Juan Francisco Son 03/28/07    Yash        Son 2/9/10    [facility] 12/2016                  PHYSICAL EXAM  /69 (Cuff Size: Adult Regular)  Pulse 72  Temp 98.4  F (36.9  C)  Resp 18  LMP   SpO2 99%  Breastfeeding? Yes   There is no height or weight on file to calculate BMI.    GENERAL: Healthy appearing patient in no acute distress. Pleasant and cooperative with exam and interview.   HEENT: Head-normocephalic. Eyes-no scleral icterus, pupils equal, round, and reactive to light. Nose-no nasal drainage. No lesions. Mouth-oral mucosa pink and moist, no lesions.  NECK: Supple. No thyroid nodules. Trachea midline.  LYMPHATICS:  No cervical, axillary or supraclavicular adenopathy.  CV: Regular rate and rhythm, no murmurs. No peripheral edema.  LUNGS:  No respiratory distress. Clear bilaterally to auscultation.  ABDOMEN: Non distended. Bowel sounds active. Soft, non-tender, no hepatosplenomegaly. Ventral hernia noted with Valsalva, no tenderness, reducible. No peritoneal signs.  SKIN: Pink, warm and dry. No jaundice. No rash.  NEURO:  Cranial nerves II-XII grossly intact. Alert and oriented.  PSYCH: Appropriate mood and affect.      Dejah Dominguez MD

## 2018-02-23 NOTE — OR POSTOP
Patient Information     Patient Name MRN Sex     Kya Herring [id number] Female 1980      OR PostOp by Tawana Gastelum RN at 2017  3:15 PM     Author:  Tawana Gastelum RN Service:  (none) Author Type:  NURS- Registered Nurse     Filed:  2017  3:21 PM Date of Service:  2017  3:15 PM Status:  Signed     :  Tawana Gastelum RN (NURS- Registered Nurse)            Discharge Note    Data:  Kya Herring has been discharged home at 1515 via ambulatory accompanied by Registered Nurse.      Action:  Written discharge/follow-up instructions were provided to patient. Prescriptions were written and sent with patient.  Belongings sent with patient. Medications from home sent with patient/family: Not Applicable  Equipment none .     Response:  Patient verbalized understanding of discharge instructions, reason for discharge, and necessary follow-up appointments.     Tawana Gastelum RN

## 2018-02-27 ENCOUNTER — DOCUMENTATION ONLY (OUTPATIENT)
Dept: FAMILY MEDICINE | Facility: OTHER | Age: 38
End: 2018-02-27

## 2018-02-27 PROBLEM — D69.6 THROMBOCYTOPENIA (H): Status: ACTIVE | Noted: 2018-02-27

## 2018-02-27 RX ORDER — PRENATAL VIT/IRON FUM/FOLIC AC 27MG-0.8MG
1 TABLET ORAL DAILY
COMMUNITY
Start: 2014-02-26 | End: 2018-04-13

## 2018-04-13 ENCOUNTER — OFFICE VISIT (OUTPATIENT)
Dept: FAMILY MEDICINE | Facility: OTHER | Age: 38
End: 2018-04-13
Attending: FAMILY MEDICINE
Payer: COMMERCIAL

## 2018-04-13 VITALS
WEIGHT: 136.6 LBS | HEIGHT: 67 IN | DIASTOLIC BLOOD PRESSURE: 70 MMHG | HEART RATE: 68 BPM | SYSTOLIC BLOOD PRESSURE: 106 MMHG | BODY MASS INDEX: 21.44 KG/M2

## 2018-04-13 DIAGNOSIS — Z87.19 HX OF HERNIA REPAIR: ICD-10-CM

## 2018-04-13 DIAGNOSIS — M77.11 RIGHT TENNIS ELBOW: ICD-10-CM

## 2018-04-13 DIAGNOSIS — Z00.00 PHYSICAL EXAM, ANNUAL: Primary | ICD-10-CM

## 2018-04-13 DIAGNOSIS — Z98.890 HX OF HERNIA REPAIR: ICD-10-CM

## 2018-04-13 PROCEDURE — 99395 PREV VISIT EST AGE 18-39: CPT | Performed by: FAMILY MEDICINE

## 2018-04-13 ASSESSMENT — PAIN SCALES - GENERAL: PAINLEVEL: NO PAIN (0)

## 2018-04-13 NOTE — PATIENT INSTRUCTIONS
Tennis elbow band, ice     Starting the first day of your period, take aleve twice a day for 2-3 days  This is to help with the heaviness of your periods.      Treating Tennis Elbow    Your treatment will depend on how inflamed your tendon is. The goal is to relieve your symptoms and help you regain full use of your elbow.  Rest and medicine  Wearing a tennis elbow splint allows the inflamed tendon to rest. It must be worn properly. It should be placed down the arm past the painful area of the elbow. If it is directly over the inflamed tendon, it can worsen the symptoms. This brace can help the tendon heal. Using your other hand or changing your  also takes stress off the tendon. Oral nonsteroidal anti-inflammatory medicines (NSAIDs) and/or ice can relieve pain and reduce swelling.  Exercises and therapy  Your healthcare provider may give you an exercise program. He or she may refer you to a therapist. The therapist will teach you to gently stretch and then strengthen the muscles around your elbow.  Anti-inflammatory injections  Your healthcare provider may give you injections of an anti-inflammatory, such as cortisone. This helps reduce swelling. You may have more pain at first. But in a few days, your elbow should feel better.  If surgery is needed  If your symptoms persist for a long time, or other treatments don t work, your healthcare provider may recommend surgery. Surgery repairs the inflamed tendon.   Date Last Reviewed: 9/26/2015 2000-2017 [employer]. [address] [place] [address]. All rights reserved. This information is not intended as a substitute for professional medical care. Always follow your healthcare professional's instructions.

## 2018-04-13 NOTE — PROGRESS NOTES
SUBJECTIVE:    Kya Herring is a 37 year old female who presents for her annual exam.    HPI: Kya Herring is a 37 year old female presents for her annual exam.    Last pap: 2017  Immunizations:  Up to date   Mammogram at age 43  Colon cancer screening at age 48  Current birth control vasectomy         PROBLEM LIST:  Patient Active Problem List   Diagnosis     Right knee meniscal tear     Thrombocytopenia (H)       PAST MEDICAL HISTORY:  Past Medical History:   Diagnosis Date     Personal history of other medical treatment (CODE)     No Comments Provided     SURGICAL HISTORY:  Past Surgical History:   Procedure Laterality Date     OTHER SURGICAL HISTORY      5/2015,17605.0,MD REMOVAL ANAL FISTULA SUBCUTANEOUS,Dr Rodrigues     OTHER SURGICAL HISTORY      12/16/2016,[id number],VAGINAL DELIVERY     OTHER SURGICAL HISTORY      12/22/2017,00724.0,MD REPAIR INCISIONAL HERNIA REDUCIBLE       SOCIAL HISTORY:  Social History     Social History     Marital status:      Spouse name: Juan José     Number of children: 4     Years of education: 12+     Occupational History     homemaker      Social History Main Topics     Smoking status: Never Smoker     Smokeless tobacco: Never Used     Alcohol use No     Drug use: Not on file      Comment: Drug use: No     Sexual activity: Yes     Partners: Male     Birth control/ protection: Male Surgical      Comment: vas     Other Topics Concern     Not on file     Social History Narrative    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   Clifford Spouse  Michoacano Son 09/12/04  Juan Francisco Son 03/28/07  Yash        Son 2/9/10  [facility] 12/2016     FAMILY HISTORY:    Family History   Problem Relation Age of Onset     HEART DISEASE Mother      Heart Disease,Heart valve replaced.Congenital heart disease     CANCER Father      Cancer,skin cancer     Other - See Comments Son      No Known Problems     Other - See Comments Son      No Known Problems     Other - See Comments Son      No Known Problems     Other - See  "Comments Daughter      No Known Problems     CANCER Paternal Aunt      Cancer,skin cancer     No Known Problems Sister      No Known Problems Brother      No Known Problems Sister      No Known Problems Brother        CURRENT MEDICATIONS:   No current outpatient prescriptions on file.     ALLERGIES:     Allergies   Allergen Reactions     Chlorhexidine Itching     Penicillins Hives         REVIEW OF SYSTEMS:  General: denies any general problems. Stopped nursing end of December.  End of February developed right lateral breast tenderness, continues to come and go; when breast feeding she would get a recurrent plugged duct  Eyes: denies problems  Ears/Nose/Throat: denies problems, last dentist visit was last month    Respiratory: denies problems  Cardiovascular: denies problems  Gastrointestinal: nodule around area of hernia repair    Genitourinary:  The second day of her period is super heavy, feels like she should stay home  Musculoskeletal: right elbow and forearm pain  Skin: dryness  Neurologic: rare headaches but if she does it is a menstrual migraine - excedrin migraine works    Psychiatric: denies problems  Endocrine: denies problems  Heme/Lymphatic: denies problems  Allergic/Immunologic: deniesproblems    PHQ-2 Score:     PHQ-2 ( 1999 [employer]) 4/13/2018   Q1: Little interest or pleasure in doing things 0   Q2: Feeling down, depressed or hopeless 0   PHQ-2 Score 0         OBJECTIVE:  /70 (BP Location: Right arm, Patient Position: Sitting, Cuff Size: Adult Regular)  Pulse 68  Ht 5' 7\" (1.702 m)  Wt 136 lb 9.6 oz (62 kg)  LMP 04/12/2018  Breastfeeding? No  BMI 21.39 kg/m2   EXAM:   General Appearance: Pleasant, alert, appropriate appearance for age. No acute distress  Head Exam: Normal. Normocephalic, atraumatic.  Eye Exam:Normal external eye, conjunctiva, lids, cornea. BELEN.  Ear Exam: Normal TM's bilaterally. Normal auditory canals and external ears. Non-tender.  Nose Exam: Normal external nose, mucus " membranes, and septum.  OroPharynx Exam:  Dental hygiene adequate. Normal buccal mucose. Normal pharynx.  Neck Exam:  Supple, no masses or nodes.  Thyroid Exam: No nodules or enlargement.  Chest/Respiratory Exam: Normal chest wall andrespirations. Clear to auscultation.  Breast Exam: No dimpling, nipple retraction or discharge. No masses or nodes.  Cardiovascular Exam: Regular rate and rhythm. S1, S2, no murmur, click, gallop, or rubs.  Gastrointestinal Exam: nodule under scar from hernia repair;    and adnexa, nontender urethra andbladder.  Musculoskeletal Exam: tenderness right lateral epicondyle  Skin: no rash or abnormalities  Neurologic Exam: Nonfocal, symmetric DTRs, normal gross motor, tone coordination and no tremor.  Psychiatric Exam: Alertand oriented - appropriate affect.    Hospital Outpatient Visit on 12/22/2017   Component Date Value Ref Range Status     HCG Qual Urine 12/22/2017 Negative  Negative Final       ASSESSMENT/PLAN    ICD-10-CM    1. Physical exam, annual Z00.00    2. Right tennis elbow M77.11    3. Hx of hernia repair Z98.890     Z87.19      1.  Pap smear due 2020  2.  Recommend tennis elbow band and icing prn.  Patient ed handout given  3.  Possible suture granuloma or localized fat necrosis.  Doesn't seem to be consistent with recurrent hernia.    Catherine Sanders MD

## 2018-04-13 NOTE — NURSING NOTE
Patient presents to the clinic today for a px.    Sudha Jesus LPN.................. 4/13/2018 1:44 PM

## 2018-04-13 NOTE — MR AVS SNAPSHOT
After Visit Summary   4/13/2018    Kya Herring    MRN: [id number]           Patient Information     Date Of Birth          1980        Visit Information        Provider Department      4/13/2018 1:45 PM Catherine Lopez MD [facility]        Today's Diagnoses     Physical exam, annual    -  1    Right tennis elbow        Hx of hernia repair          Care Instructions    Tennis elbow band, ice     Starting the first day of your period, take aleve twice a day for 2-3 days  This is to help with the heaviness of your periods.      Treating Tennis Elbow    Your treatment will depend on how inflamed your tendon is. The goal is to relieve your symptoms and help you regain full use of your elbow.  Rest and medicine  Wearing a tennis elbow splint allows the inflamed tendon to rest. It must be worn properly. It should be placed down the arm past the painful area of the elbow. If it is directly over the inflamed tendon, it can worsen the symptoms. This brace can help the tendon heal. Using your other hand or changing your  also takes stress off the tendon. Oral nonsteroidal anti-inflammatory medicines (NSAIDs) and/or ice can relieve pain and reduce swelling.  Exercises and therapy  Your healthcare provider may give you an exercise program. He or she may refer you to a therapist. The therapist will teach you to gently stretch and then strengthen the muscles around your elbow.  Anti-inflammatory injections  Your healthcare provider may give you injections of an anti-inflammatory, such as cortisone. This helps reduce swelling. You may have more pain at first. But in a few days, your elbow should feel better.  If surgery is needed  If your symptoms persist for a long time, or other treatments don t work, your healthcare provider may recommend surgery. Surgery repairs the inflamed tendon.   Date Last Reviewed: 9/26/2015 2000-2017 [employer]. [address], [place] [address]. All rights reserved. This information is not intended as a substitute for professional medical care. Always follow your healthcare professional's instructions.                Follow-ups after your visit        Who to contact     If you have questions or need follow up information about today's clinic visit or your schedule please contact [facility] directly at [phone].  Normal or non-critical lab and imaging results will be communicated to you by MyChart, letter or phone within 4 business days after the clinic has received the results. If you do not hear from us within 7 days, please contact the clinic through [employer] or phone. If you have a critical or abnormal lab result, we will notify you by phone as soon as possible.  Submit refill requests through [email or web address] or call your pharmacy and they will forward the refill request to us. Please allow 3 business days for your refill to be completed.          Additional Information About Your Visit        [employer] Information     [email or web address] lets you send messages to your doctor, view your test results, renew your prescriptions, schedule appointments and more. To sign up, go to [email or web address] . Click on \"Log in\" on the left side of the screen, which will take you to the Welcome page. Then click on \"Sign up Now\" on the right side of the page.     You will be asked to enter the access code listed below, as well as some personal information. Please follow the directions to create your username and password.     Your access code is: [email or web address]  Expires: 2018  4:30 PM     Your access code will  in 90 days. If you need help or a new code, please call your [facility] clinic or [phone].        Care EveryWhere ID     This is your Care EveryWhere ID. This could be used by other organizations to access your [facility] medical records  [id number]        Your Vitals Were     Pulse Height Last Period Breastfeeding? BMI " "(Body Mass Index)       68 5' 7\" (1.702 m) 04/12/2018 No 21.39 kg/m2        Blood Pressure from Last 3 Encounters:   04/13/18 106/70   01/05/18 118/72   06/09/17 108/62    Weight from Last 3 Encounters:   04/13/18 136 lb 9.6 oz (62 kg)   01/05/18 138 lb (62.6 kg)   06/09/17 137 lb 8 oz (62.4 kg)              Today, you had the following     No orders found for display         Today's Medication Changes          These changes are accurate as of 4/13/18  4:30 PM.  If you have any questions, ask your nurse or doctor.               Stop taking these medicines if you haven't already. Please contact your care team if you have questions.     CALCIUM PO   Stopped by:  Catherine Lopez MD           prenatal multivitamin plus iron 27-0.8 MG Tabs per tablet   Stopped by:  Catherine Lopez MD                    Primary Care Provider Office Phone # Fax #    Catherine Isabel [phone] [address] [facility] [address]        Equal Access to Services     [facility]: Hadii mika black Sojamaal, waaxda lualanadaha, qaybta kaalmada adeamparo, lashell barakat . So [facility] [phone].    ATENCIÓN: Si habla español, tiene a leroy disposición servicios gratuitos de asistencia lingüística. Llame al [phone].    We comply with applicable federal civil rights laws and [place] laws. We do not discriminate on the basis of race, color, national origin, age, disability, sex, sexual orientation, or gender identity.            Thank you!     Thank you for choosing [facility]  for your care. Our goal is always to provide you with excellent care. Hearing back from our patients is one way we can continue to improve our services. Please take a few minutes to complete the written survey that you may receive in the mail after your visit with us. Thank you!             Your Updated Medication List - Protect others around you: Learn how " to safely use, store and throw away your medicines at www.disposemymeds.org.      Notice  As of 4/13/2018  4:30 PM    You have not been prescribed any medications.

## 2018-07-23 NOTE — PROGRESS NOTES
Patient Information     Patient Name  Kya Herring MRN  [id number] Sex  Female   1980      Letter by Catherine Sanchez MD at      Author:  Catherine Sanchez MD Service:  (none) Author Type:  (none)    Filed:   Encounter Date:  2017 Status:  (Other)           Kya Herring  [address] [facility] [address]          2017    Dear MsJohnson How:    Your pap smear and HPV results are normal.  Your next pap smear is due in 5 years.    Sincerely,  Catherine Sanders MD FAAFP 2017 4:55 PM

## 2019-12-09 ENCOUNTER — OFFICE VISIT (OUTPATIENT)
Dept: FAMILY MEDICINE | Facility: OTHER | Age: 39
End: 2019-12-09
Attending: FAMILY MEDICINE
Payer: COMMERCIAL

## 2019-12-09 VITALS
WEIGHT: 145 LBS | BODY MASS INDEX: 22.76 KG/M2 | DIASTOLIC BLOOD PRESSURE: 80 MMHG | SYSTOLIC BLOOD PRESSURE: 110 MMHG | HEART RATE: 76 BPM | RESPIRATION RATE: 16 BRPM | HEIGHT: 67 IN | TEMPERATURE: 98.3 F

## 2019-12-09 DIAGNOSIS — D69.6 THROMBOCYTOPENIA (H): ICD-10-CM

## 2019-12-09 DIAGNOSIS — M25.561 LATERAL KNEE PAIN, RIGHT: ICD-10-CM

## 2019-12-09 DIAGNOSIS — Z00.00 PHYSICAL EXAM, ANNUAL: Primary | ICD-10-CM

## 2019-12-09 LAB
DEPRECATED CALCIDIOL+CALCIFEROL SERPL-MC: 26.1 NG/ML
ERYTHROCYTE [DISTWIDTH] IN BLOOD BY AUTOMATED COUNT: 12.8 % (ref 10–15)
HCT VFR BLD AUTO: 37 % (ref 35–47)
HGB BLD-MCNC: 11.9 G/DL (ref 11.7–15.7)
MCH RBC QN AUTO: 30.3 PG (ref 26.5–33)
MCHC RBC AUTO-ENTMCNC: 32.2 G/DL (ref 31.5–36.5)
MCV RBC AUTO: 94 FL (ref 78–100)
PLATELET # BLD AUTO: 141 10E9/L (ref 150–450)
RBC # BLD AUTO: 3.93 10E12/L (ref 3.8–5.2)
WBC # BLD AUTO: 6.1 10E9/L (ref 4–11)

## 2019-12-09 PROCEDURE — 99396 PREV VISIT EST AGE 40-64: CPT | Performed by: FAMILY MEDICINE

## 2019-12-09 PROCEDURE — 82306 VITAMIN D 25 HYDROXY: CPT | Mod: ZL | Performed by: FAMILY MEDICINE

## 2019-12-09 PROCEDURE — G0463 HOSPITAL OUTPT CLINIC VISIT: HCPCS

## 2019-12-09 PROCEDURE — 85027 COMPLETE CBC AUTOMATED: CPT | Mod: ZL | Performed by: FAMILY MEDICINE

## 2019-12-09 PROCEDURE — 36415 COLL VENOUS BLD VENIPUNCTURE: CPT | Mod: ZL | Performed by: FAMILY MEDICINE

## 2019-12-09 ASSESSMENT — MIFFLIN-ST. JEOR: SCORE: 1355.35

## 2019-12-09 ASSESSMENT — PAIN SCALES - GENERAL: PAINLEVEL: NO PAIN (0)

## 2019-12-09 NOTE — LETTER
December 10, 2019      Kya Herring  [address]  [place] [address]          Dear Kya,     Here is a copy of your recent labs:    Results for orders placed or performed in visit on 12/09/19   Vitamin D Total     Status: None   Result Value Ref Range    Vitamin D Total 26.1 ng/mL   CBC W PLT No Diff     Status: Abnormal   Result Value Ref Range    WBC 6.1 4.0 - 11.0 10e9/L    RBC Count 3.93 3.8 - 5.2 10e12/L    Hemoglobin 11.9 11.7 - 15.7 g/dL    Hematocrit 37.0 35.0 - 47.0 %    MCV 94 78 - 100 fl    MCH 30.3 26.5 - 33.0 pg    MCHC 32.2 31.5 - 36.5 g/dL    RDW 12.8 10.0 - 15.0 %    Platelet Count 141 (L) 150 - 450 10e9/L       Your platelet count is stable.    Your hemoglobin level is just at normal.  When checked a few years ago, is was 14.5.  With having more frequent periods, it has likely gone down some.    Your vitamin D level is low.  Normal is over 30.    Based on these results, what I would recommend for vitamins would be a daily multivitamin that has both iron and vitamin D in it.  I'd also recommend additional vitamin D at 1000 units a day.  Vitamin D is best absorbed if taken with food.      Let me know if you have any questions about these results and recommendations.  We will recheck them next year at your check up.          Sincerely,        ANGELICA WHITMORE MD

## 2019-12-09 NOTE — NURSING NOTE
Patient presents to the clinic today for a px.  Med rec complete.  Sudha Jesus LPN.................. 12/9/2019 3:11 PM

## 2019-12-09 NOTE — PROGRESS NOTES
Nursing Notes:   Sudha Jesus LPN  12/9/2019  3:35 PM  Signed  Patient presents to the clinic today for a px.  Med rec complete.  Sudha Jesus LPN.................. 12/9/2019 3:11 PM    SUBJECTIVE:    Kya Herring is a 39 year old female who presents for her annual exam.    HPI: Kya Herring is a 39 year old female presents for her annual exam.    She currently does not take any medications and is wondering if she should be taking any type of supplement or vitamin.    Last pap: 2017  Immunizations:  Discussed flu vaccine; tetanus done 2016.  Mammogram at age 43  Colon cancer screening at age 48  Current birth control vasectomy     Knee pain:  Started 10 years ago right lateral knee.  It will come and go.  Sometimes it is after running, sometimes it is random.  This can be for a few hours to over a day.    MRI was done 2014 - this showed some medial meniscus degeneration - but not in the lateral area where her pain is.        PROBLEM LIST:  Patient Active Problem List   Diagnosis     Right knee meniscal tear     Thrombocytopenia (H)       PAST MEDICAL HISTORY:  Past Medical History:   Diagnosis Date     Gestational thrombocytopenia (H)      Vaginal delivery     x4     SURGICAL HISTORY:  Past Surgical History:   Procedure Laterality Date     AS LAP INCISIONAL HERNIA REPAIR  12/22/2017 12/22/2017,54785.0,CA REPAIR INCISIONAL HERNIA REDUCIBLE     REMOVE FISTULA ANAL  05/2015    ,Dr Rodrigues       SOCIAL HISTORY:  Social History     Socioeconomic History     Marital status:      Spouse name: Juan José     Number of children: 4     Years of education: 12+     Highest education level: Not on file   Occupational History     Occupation: homemaker   Social Needs     Financial resource strain: Not on file     Food insecurity:     Worry: Not on file     Inability: Not on file     Transportation needs:     Medical: Not on file     Non-medical: Not on file   Tobacco Use     Smoking status: Never Smoker      Smokeless tobacco: Never Used   Substance and Sexual Activity     Alcohol use: No     Drug use: Never     Comment: Drug use: No     Sexual activity: Yes     Partners: Male     Birth control/protection: Male Surgical     Comment: vas   Lifestyle     Physical activity:     Days per week: Not on file     Minutes per session: Not on file     Stress: Not on file   Relationships     Social connections:     Talks on phone: Not on file     Gets together: Not on file     Attends Taoism service: Not on file     Active member of club or organization: Not on file     Attends meetings of clubs or organizations: Not on file     Relationship status: Not on file     Intimate partner violence:     Fear of current or ex partner: Not on file     Emotionally abused: Not on file     Physically abused: Not on file     Forced sexual activity: Not on file   Other Topics Concern     Not on file   Social History Narrative    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   Clifford Spouse  [place] Son 09/12/04  Juan Francisco Son 03/28/07  Yash Son 2/9/10  [facility] 12/2016     FAMILY HISTORY:    Family History   Problem Relation Age of Onset     Heart Disease Mother         Heart Disease,Heart valve replaced.Congenital heart disease     Bladder Cancer Father         Cancer,skin cancer     Other - See Comments Son         No Known Problems     Other - See Comments Son         No Known Problems     Other - See Comments Son         No Known Problems     Other - See Comments Daughter         No Known Problems     Cancer Paternal Aunt         Cancer,skin cancer     No Known Problems Sister      No Known Problems Brother      No Known Problems Sister      No Known Problems Brother        CURRENT MEDICATIONS:   No current outpatient medications on file.     ALLERGIES:     Allergies   Allergen Reactions     Chlorhexidine Itching     Penicillins Hives         REVIEW OF SYSTEMS:  General: denies any general problems.  Eyes: denies problems - feels like she may need  "reading glasses   Ears/Nose/Throat: denies problems, last dentist visit was close to February   Respiratory: denies problems  Cardiovascular: denies problems  Gastrointestinal: denies problems  Genitourinary: denies problems   - cycles are shorter - used to be 45 and are now 28-  one day is super heavy, last 6-7 days,   Patient's last menstrual period was 11/21/2019 (approximate). LMP  - mid -November   Musculoskeletal: see above   Skin: denies problems  Neurologic: denies problems  Psychiatric: denies problems  Endocrine: denies problems  Heme/Lymphatic: denies problems  Allergic/Immunologic: denies problems      PHQ-2 Score:     PHQ-2 ( 1999 [employer]) 4/13/2018   Q1: Little interest or pleasure in doing things 0   Q2: Feeling down, depressed or hopeless 0   PHQ-2 Score 0       PHQ-9 SCORE 5/9/2016 7/5/2016 1/27/2017   PHQ-9 Total Score 1 0 0     No flowsheet data found.            OBJECTIVE:  /80   Pulse 76   Temp 98.3  F (36.8  C) (Tympanic)   Resp 16   Ht 1.702 m (5' 7\")   Wt 65.8 kg (145 lb)   LMP 11/21/2019 (Approximate)   Breastfeeding No   BMI 22.71 kg/m     Patient's last menstrual period was 11/21/2019 (approximate).    EXAM:   General Appearance: Pleasant, alert, appropriate appearance for age. No acute distress  Head Exam: Normal. Normocephalic, atraumatic.  Eye Exam:Normal external eye, conjunctiva, lids, cornea. BELEN.  Ear Exam: Normal TM's bilaterally. Normal auditory canals and external ears. Non-tender.  Nose Exam: Normal external nose  OroPharynx Exam:  Dental hygiene adequate. Normal buccal mucose. Normal pharynx.  Neck Exam:  Supple, no masses or nodes.  Thyroid Exam: No nodules or enlargement.  Chest/Respiratory Exam: Normal chest wall andrespirations. Clear to auscultation.  Breast Exam: No dimpling, nipple retraction or discharge. No masses or nodes.  Cardiovascular Exam: Regular rate and rhythm. S1, S2, no murmur, click, gallop, or rubs.  Gastrointestinal Exam: Soft, non-tender, " no masses or organomegaly.  Musculoskeletal Exam: Mild tenderness right knee, inferior lateral to patella.  Knee and hip range of motion is normal.  Foot Exam: Left and right foot: good pedal pulses, no lesions, nail hygiene good.  Skin: no rash or abnormalities  Neurologic Exam: Nonfocal, symmetric DTRs, normal gross motor, tone coordination and no tremor.  Psychiatric Exam: Alert and oriented - appropriate affect.    Office Visit - [facility] on 01/27/2017   Component Date Value Ref Range Status     % Lymphocytes 01/27/2017 27.7  20.0 - 44.0 % Final     Platelet Count 01/27/2017 127* 140 - 440 thou/cu mm Final     MPV 01/27/2017 13.9* 6.5 - 11.0 fL Final     Absolute Eosinophils - Historical 01/27/2017 0.0  <0.5 thou/cu mm Final     % Neutrophils 01/27/2017 63.4  42.0 - 72.0 % Final     % Monocytes 01/27/2017 7.5  <12.0 % Final     BASO 01/27/2017 0.6  <3.0 % Final     Absolute Neutrophils - Historical 01/27/2017 3.1  1.7 - 7.0 thou/cu mm Final     Absolute Lymphocytes - Historical 01/27/2017 1.4  0.9 - 2.9 thou/cu mm Final     % Eosinophils 01/27/2017 0.9  <8.0 % Final     Absolute Monocytes - Historical 01/27/2017 0.4  <0.9 thou/cu mm Final     Absolute Basophils - Historical 01/27/2017 0.0  <0.3 thou/cu mm Final     White Blood Count - Historical 01/27/2017 4.9  4.5 - 11.0 thou/cu mm Final     Hematocrit 01/27/2017 44.1  33.0 - 51.0 % Final     Red Blood Count - Historical 01/27/2017 4.53  4.00 - 5.20 mil/cu mm Final     Hemoglobin 01/27/2017 14.5  12.0 - 16.0 g/dL Final     RDW 01/27/2017 11.0* 11.5 - 15.5 % Final     MCHC 01/27/2017 32.8  32.0 - 36.0 g/dL Final     MCH 01/27/2017 32.0  26.0 - 34.0 pg Final     MCV 01/27/2017 97  80 - 100 fL Final     HPV Results - [facility] Historical 02/07/2017 Negative  Negative Final    HPV types 16, 18, 31, 33, 35, 39, 45, 51, 52, 56, 58, 59, 66 and 68 DNA were undetectable or below the pre-set threshold.       ASSESSMENT/PLAN:      ICD-10-CM    1. Physical exam, annual Z00.00 MA  Screen Bilateral w/Choco     Vitamin D Total     Vitamin D Total   2. Thrombocytopenia (H) D69.6 CBC W PLT No Diff     CBC W PLT No Diff   3. Lateral knee pain, right M25.561         1. Discussed starting breast cancer screening/mammography.  Orders placed.  2. CBC with platelets as ordered, patient with chronic mild thrombocytopenia.  3. Discussed previous imaging findings with her right knee, the area of meniscal degeneration was medial, her symptoms are more lateral.  We discussed bursitis, tendinitis and overuse injuries.  She will consider physical therapy referral.  4. Flu vaccine offered, refused by patient today.      Catherine aSnders MD

## 2019-12-20 ENCOUNTER — HOSPITAL ENCOUNTER (OUTPATIENT)
Dept: MAMMOGRAPHY | Facility: OTHER | Age: 39
Discharge: HOME | End: 2019-12-20
Attending: FAMILY MEDICINE | Admitting: FAMILY MEDICINE
Payer: COMMERCIAL

## 2019-12-20 DIAGNOSIS — Z00.00 PHYSICAL EXAM, ANNUAL: ICD-10-CM

## 2019-12-20 DIAGNOSIS — Z12.31 VISIT FOR SCREENING MAMMOGRAM: ICD-10-CM

## 2019-12-20 PROCEDURE — 77063 BREAST TOMOSYNTHESIS BI: CPT

## 2019-12-30 ENCOUNTER — HOSPITAL ENCOUNTER (OUTPATIENT)
Dept: MAMMOGRAPHY | Facility: OTHER | Age: 39
End: 2019-12-30
Attending: FAMILY MEDICINE
Payer: COMMERCIAL

## 2019-12-30 DIAGNOSIS — R92.8 ABNORMAL FINDING ON BREAST IMAGING: ICD-10-CM

## 2019-12-30 PROCEDURE — G0279 TOMOSYNTHESIS, MAMMO: HCPCS

## 2021-01-12 ENCOUNTER — HOSPITAL ENCOUNTER (OUTPATIENT)
Dept: MAMMOGRAPHY | Facility: OTHER | Age: 41
Discharge: HOME | End: 2021-01-12
Attending: FAMILY MEDICINE | Admitting: FAMILY MEDICINE
Payer: COMMERCIAL

## 2021-01-12 DIAGNOSIS — Z12.31 VISIT FOR SCREENING MAMMOGRAM: ICD-10-CM

## 2021-01-12 PROCEDURE — 77063 BREAST TOMOSYNTHESIS BI: CPT

## 2021-08-06 ENCOUNTER — OFFICE VISIT (OUTPATIENT)
Dept: FAMILY MEDICINE | Facility: OTHER | Age: 41
End: 2021-08-06
Attending: NURSE PRACTITIONER
Payer: COMMERCIAL

## 2021-08-06 VITALS
RESPIRATION RATE: 18 BRPM | SYSTOLIC BLOOD PRESSURE: 110 MMHG | HEART RATE: 73 BPM | HEIGHT: 68 IN | DIASTOLIC BLOOD PRESSURE: 70 MMHG | OXYGEN SATURATION: 99 % | TEMPERATURE: 98.5 F | WEIGHT: 138 LBS | BODY MASS INDEX: 20.92 KG/M2

## 2021-08-06 DIAGNOSIS — R30.9 PAINFUL URINATION: Primary | ICD-10-CM

## 2021-08-06 DIAGNOSIS — R39.89 SUSPECTED UTI: ICD-10-CM

## 2021-08-06 LAB
ALBUMIN UR-MCNC: NEGATIVE MG/DL
APPEARANCE UR: CLEAR
BACTERIA #/AREA URNS HPF: ABNORMAL /HPF
BILIRUB UR QL STRIP: NEGATIVE
COLOR UR AUTO: YELLOW
GLUCOSE UR STRIP-MCNC: NEGATIVE MG/DL
HGB UR QL STRIP: ABNORMAL
KETONES UR STRIP-MCNC: NEGATIVE MG/DL
LEUKOCYTE ESTERASE UR QL STRIP: ABNORMAL
MUCOUS THREADS #/AREA URNS LPF: PRESENT /LPF
NITRATE UR QL: NEGATIVE
PH UR STRIP: 5 [PH] (ref 5–9)
RBC URINE: 15 /HPF
SP GR UR STRIP: 1 (ref 1–1.03)
UROBILINOGEN UR STRIP-MCNC: NORMAL MG/DL
WBC URINE: 5 /HPF

## 2021-08-06 PROCEDURE — G0463 HOSPITAL OUTPT CLINIC VISIT: HCPCS

## 2021-08-06 PROCEDURE — 99214 OFFICE O/P EST MOD 30 MIN: CPT | Performed by: NURSE PRACTITIONER

## 2021-08-06 PROCEDURE — 81001 URINALYSIS AUTO W/SCOPE: CPT | Mod: ZL | Performed by: NURSE PRACTITIONER

## 2021-08-06 PROCEDURE — 87086 URINE CULTURE/COLONY COUNT: CPT | Mod: ZL | Performed by: NURSE PRACTITIONER

## 2021-08-06 RX ORDER — NITROFURANTOIN 25; 75 MG/1; MG/1
100 CAPSULE ORAL 2 TIMES DAILY
Qty: 10 CAPSULE | Refills: 0 | Status: SHIPPED | OUTPATIENT
Start: 2021-08-06 | End: 2021-08-11

## 2021-08-06 ASSESSMENT — PAIN SCALES - GENERAL: PAINLEVEL: MILD PAIN (2)

## 2021-08-06 ASSESSMENT — MIFFLIN-ST. JEOR: SCORE: 1321.52

## 2021-08-06 NOTE — NURSING NOTE
"Chief Complaint   Patient presents with     UTI     Patient presented to the clinic with a possible UTI that started last weekend early Monday with lower abdominal pain and some pain with urination.    Initial /70 (BP Location: Right arm, Patient Position: Sitting, Cuff Size: Adult Regular)   Pulse 73   Temp 98.5  F (36.9  C) (Tympanic)   Resp 18   Ht 1.715 m (5' 7.5\")   Wt 62.6 kg (138 lb)   LMP  (LMP Unknown)   SpO2 99%   Breastfeeding No   BMI 21.29 kg/m   Estimated body mass index is 21.29 kg/m  as calculated from the following:    Height as of this encounter: 1.715 m (5' 7.5\").    Weight as of this encounter: 62.6 kg (138 lb).     FOOD SECURITY SCREENING QUESTIONS  Hunger Vital Signs:  Within the past 12 months we worried whether our food would run out before we got money to buy more. Never  Within the past 12 months the food we bought just didn't last and we didn't have money to get more. Never      Medication Reconciliation: Complete      Maite Lehman, MARIKA   "

## 2021-08-06 NOTE — PROGRESS NOTES
ASSESSMENT/PLAN:  1. Painful urination    - UA reflex to Microscopic and Culture  - Urine Culture    2. Suspected UTI    - nitroFURantoin macrocrystal-monohydrate (MACROBID) 100 MG capsule; Take 1 capsule (100 mg) by mouth 2 times daily for 5 days  Dispense: 10 capsule; Refill: 0    UA results showed blood, leukocyte esterase, bacteria and mucous present.     Urine microscopic results showed RBC.     Discussed urine results with the patient and informed her that she will be prescribed macorbid BID x 5 days for acute cystitis with hematuria.     Urine culture results pending.      Encouraged fluids and discussed with the patient that she may take OTC AZO for the burning with urination while starting the antibiotic, discussed that this may change her urine orange in appearance.     May use over-the-counter Tylenol or ibuprofen PRN    Discussed warning signs/symptoms indicative of need to f/u    Follow up if symptoms persist or worsen or concerns      I explained my diagnostic considerations and recommendations to the patient, who voiced understanding and agreement with the treatment plan. All questions were answered. We discussed potential side effects of any prescribed or recommended therapies, as well as expectations for response to treatments.        HPI:    Kya Herring is a 41 year old female  who presents to [facility] today for pelvic pain, frequency, burning with urination, fatigue and pink urine. Denies fevers. Symptoms started last weekend/early this week. Denies urgency. Denies abnoromal vaginal discharge. No STD concerns. LMP was middle of July.     Past Medical History:   Diagnosis Date     Gestational thrombocytopenia (H)      Vaginal delivery     x4     Past Surgical History:   Procedure Laterality Date     AS LAP INCISIONAL HERNIA REPAIR  12/22/2017 12/22/2017,98747.0,MO REPAIR INCISIONAL HERNIA REDUCIBLE     REMOVE FISTULA ANAL  05/2015    ,Dr Rodrigues     Social History     Tobacco Use      "Smoking status: Never Smoker     Smokeless tobacco: Never Used   Substance Use Topics     Alcohol use: No     No current outpatient medications on file.     Allergies   Allergen Reactions     Chlorhexidine Itching     Penicillins Hives         Past medical history, past surgical history, current medications and allergies reviewed and accurate to the best of my knowledge.        ROS:  Refer to HPI    /70 (BP Location: Right arm, Patient Position: Sitting, Cuff Size: Adult Regular)   Pulse 73   Temp 98.5  F (36.9  C) (Tympanic)   Resp 18   Ht 1.715 m (5' 7.5\")   Wt 62.6 kg (138 lb)   LMP  (LMP Unknown)   SpO2 99%   Breastfeeding No   BMI 21.29 kg/m      EXAM:  General Appearance: Well appearing female, appropriate appearance for age. No acute distress  Respiratory: normal chest wall and respirations.  Normal effort.  Clear to auscultation bilaterally, no wheezing, crackles or rhonchi.  No increased work of breathing.  No cough appreciated.  Cardiac: RRR with no murmurs  Abdomen: soft, nontender, no rigidity, no rebound tenderness or guarding, normal bowel sounds present  :  Suprapubic tenderness to palpation.  No CVA tenderness to palpation.    Psychological: normal affect, alert, oriented, and pleasant.         "

## 2021-08-07 NOTE — PATIENT INSTRUCTIONS

## 2021-08-08 LAB — BACTERIA UR CULT: NORMAL

## 2021-08-20 ENCOUNTER — MYC MEDICAL ADVICE (OUTPATIENT)
Dept: FAMILY MEDICINE | Facility: OTHER | Age: 41
End: 2021-08-20

## 2021-10-09 ENCOUNTER — HEALTH MAINTENANCE LETTER (OUTPATIENT)
Age: 41
End: 2021-10-09

## 2022-01-31 ENCOUNTER — HOSPITAL ENCOUNTER (OUTPATIENT)
Dept: MAMMOGRAPHY | Facility: OTHER | Age: 42
Discharge: HOME | End: 2022-01-31
Attending: FAMILY MEDICINE | Admitting: FAMILY MEDICINE
Payer: COMMERCIAL

## 2022-01-31 DIAGNOSIS — Z12.31 VISIT FOR SCREENING MAMMOGRAM: ICD-10-CM

## 2022-01-31 PROCEDURE — 77067 SCR MAMMO BI INCL CAD: CPT

## 2022-02-04 ENCOUNTER — HOSPITAL ENCOUNTER (OUTPATIENT)
Dept: MAMMOGRAPHY | Facility: OTHER | Age: 42
End: 2022-02-04
Attending: FAMILY MEDICINE
Payer: COMMERCIAL

## 2022-02-04 ENCOUNTER — HOSPITAL ENCOUNTER (OUTPATIENT)
Dept: ULTRASOUND IMAGING | Facility: OTHER | Age: 42
End: 2022-02-04
Attending: FAMILY MEDICINE
Payer: COMMERCIAL

## 2022-02-04 DIAGNOSIS — R92.8 ABNORMAL FINDING ON BREAST IMAGING: ICD-10-CM

## 2022-02-04 PROCEDURE — 76642 ULTRASOUND BREAST LIMITED: CPT | Mod: LT

## 2022-02-04 PROCEDURE — 77061 BREAST TOMOSYNTHESIS UNI: CPT | Mod: LT

## 2022-03-25 ENCOUNTER — OFFICE VISIT (OUTPATIENT)
Dept: FAMILY MEDICINE | Facility: OTHER | Age: 42
End: 2022-03-25
Attending: FAMILY MEDICINE
Payer: COMMERCIAL

## 2022-03-25 VITALS
BODY MASS INDEX: 22.26 KG/M2 | TEMPERATURE: 98 F | SYSTOLIC BLOOD PRESSURE: 118 MMHG | OXYGEN SATURATION: 99 % | WEIGHT: 141.8 LBS | RESPIRATION RATE: 16 BRPM | DIASTOLIC BLOOD PRESSURE: 76 MMHG | HEIGHT: 67 IN | HEART RATE: 89 BPM

## 2022-03-25 DIAGNOSIS — Z00.00 PHYSICAL EXAM, ANNUAL: Primary | ICD-10-CM

## 2022-03-25 DIAGNOSIS — R20.2 RIGHT LEG PARESTHESIAS: ICD-10-CM

## 2022-03-25 DIAGNOSIS — R79.0 LOW FERRITIN: Primary | ICD-10-CM

## 2022-03-25 LAB
ALBUMIN SERPL-MCNC: 4.7 G/DL (ref 3.5–5.7)
ALP SERPL-CCNC: 38 U/L (ref 34–104)
ALT SERPL W P-5'-P-CCNC: 6 U/L (ref 7–52)
ANION GAP SERPL CALCULATED.3IONS-SCNC: 2 MMOL/L (ref 3–14)
AST SERPL W P-5'-P-CCNC: 11 U/L (ref 13–39)
BILIRUB SERPL-MCNC: 0.5 MG/DL (ref 0.3–1)
BUN SERPL-MCNC: 12 MG/DL (ref 7–25)
CALCIUM SERPL-MCNC: 9.3 MG/DL (ref 8.6–10.3)
CHLORIDE BLD-SCNC: 106 MMOL/L (ref 98–107)
CO2 SERPL-SCNC: 28 MMOL/L (ref 21–31)
CREAT SERPL-MCNC: 0.73 MG/DL (ref 0.6–1.2)
ERYTHROCYTE [DISTWIDTH] IN BLOOD BY AUTOMATED COUNT: 12.5 % (ref 10–15)
FERRITIN SERPL-MCNC: 6 NG/ML (ref 24–336)
GFR SERPL CREATININE-BSD FRML MDRD: >90 ML/MIN/1.73M2
GLUCOSE BLD-MCNC: 96 MG/DL (ref 70–105)
HCT VFR BLD AUTO: 38.5 % (ref 35–47)
HGB BLD-MCNC: 12.7 G/DL (ref 11.7–15.7)
MCH RBC QN AUTO: 31 PG (ref 26.5–33)
MCHC RBC AUTO-ENTMCNC: 33 G/DL (ref 31.5–36.5)
MCV RBC AUTO: 94 FL (ref 78–100)
PLATELET # BLD AUTO: 155 10E3/UL (ref 150–450)
POTASSIUM BLD-SCNC: 3.7 MMOL/L (ref 3.5–5.1)
PROT SERPL-MCNC: 7.5 G/DL (ref 6.4–8.9)
RBC # BLD AUTO: 4.1 10E6/UL (ref 3.8–5.2)
SODIUM SERPL-SCNC: 136 MMOL/L (ref 134–144)
TSH SERPL DL<=0.005 MIU/L-ACNC: 1.49 MU/L (ref 0.4–4)
VIT B12 SERPL-MCNC: 258 PG/ML (ref 180–914)
WBC # BLD AUTO: 4.9 10E3/UL (ref 4–11)

## 2022-03-25 PROCEDURE — 99396 PREV VISIT EST AGE 40-64: CPT | Performed by: FAMILY MEDICINE

## 2022-03-25 PROCEDURE — 36415 COLL VENOUS BLD VENIPUNCTURE: CPT | Mod: ZL | Performed by: FAMILY MEDICINE

## 2022-03-25 PROCEDURE — 80053 COMPREHEN METABOLIC PANEL: CPT | Mod: ZL | Performed by: FAMILY MEDICINE

## 2022-03-25 PROCEDURE — 82607 VITAMIN B-12: CPT | Mod: ZL | Performed by: FAMILY MEDICINE

## 2022-03-25 PROCEDURE — 84443 ASSAY THYROID STIM HORMONE: CPT | Mod: ZL | Performed by: FAMILY MEDICINE

## 2022-03-25 PROCEDURE — G0123 SCREEN CERV/VAG THIN LAYER: HCPCS | Performed by: FAMILY MEDICINE

## 2022-03-25 PROCEDURE — 87624 HPV HI-RISK TYP POOLED RSLT: CPT | Mod: ZL | Performed by: FAMILY MEDICINE

## 2022-03-25 PROCEDURE — 85014 HEMATOCRIT: CPT | Mod: ZL | Performed by: FAMILY MEDICINE

## 2022-03-25 PROCEDURE — 82728 ASSAY OF FERRITIN: CPT | Mod: ZL | Performed by: FAMILY MEDICINE

## 2022-03-25 ASSESSMENT — PAIN SCALES - GENERAL: PAINLEVEL: NO PAIN (0)

## 2022-03-25 NOTE — PROGRESS NOTES
"   SUBJECTIVE:   CC: Kya Herring is an 41 year old woman who presents for preventive health visit.   Nursing Notes:   So Corona MA  3/25/2022  1:52 PM  Signed  Chief Complaint   Patient presents with     Physical     Patient is here for annual physical     Initial /76 (BP Location: Right arm, Patient Position: Sitting, Cuff Size: Adult Regular)   Pulse 89   Temp 98  F (36.7  C) (Tympanic)   Resp 16   Ht 1.702 m (5' 7\")   Wt 64.3 kg (141 lb 12.8 oz)   LMP 03/09/2022   SpO2 99%   Breastfeeding No   BMI 22.21 kg/m   Estimated body mass index is 22.21 kg/m  as calculated from the following:    Height as of this encounter: 1.702 m (5' 7\").    Weight as of this encounter: 64.3 kg (141 lb 12.8 oz).  Medication Reconciliation: marilee Corona MA         Patient has been advised of split billing requirements and indicates understanding: Yes  Healthy Habits:     Getting at least 3 servings of Calcium per day:  Yes    Bi-annual eye exam:  Yes    Dental care twice a year:  Yes    Sleep apnea or symptoms of sleep apnea:  None    Diet:  Other    Frequency of exercise:  4-5 days/week    Duration of exercise:  30-45 minutes    Taking medications regularly:  Yes    Medication side effects:  Not applicable    PHQ-2 Total Score: 0    Additional concerns today:  No    Acute symptoms:  Onset last year of right leg/shin symptoms.  On the treadmill, she had a sharp electrical feeling on the right medial shin on there lateral side she will a less intense, poky type feeling.  There is a lump in the area medially where this started.  Mom with RLS, venous insufficiency.      She had COVID after Thanksgiving 2021.  She's had some anterior chest pain since then.  She had a horrible cough when she was sick.        Today's PHQ-2 Score:   PHQ-2 ( 1999 Pfizer) 3/25/2022   Q1: Little interest or pleasure in doing things 0   Q2: Feeling down, depressed or hopeless 0   PHQ-2 Score 0   PHQ-2 Total Score (12-17 Years)- " Positive if 3 or more points; Administer PHQ-A if positive -   Q1: Little interest or pleasure in doing things Not at all   Q2: Feeling down, depressed or hopeless Not at all   PHQ-2 Score 0       Abuse: Current or Past (Physical, Sexual or Emotional) - No  Do you feel safe in your environment? Yes    Have you ever done Advance Care Planning? (For example, a Health Directive, POLST, or a discussion with a medical provider or your loved ones about your wishes): No, advance care planning information given to patient to review.  Patient declined advance care planning discussion at this time.    Social History     Tobacco Use     Smoking status: Never Smoker     Smokeless tobacco: Never Used   Substance Use Topics     Alcohol use: No     If you drink alcohol do you typically have >3 drinks per day or >7 drinks per week? No    Alcohol Use 3/25/2022   Prescreen: >3 drinks/day or >7 drinks/week? Not Applicable       Reviewed orders with patient.  Reviewed health maintenance and updated orders accordingly - Yes  Lab work is in process    Breast Cancer Screening:  Any new diagnosis of family breast, ovarian, or bowel cancer? No    FHS-7:   Breast CA Risk Assessment (FHS-7) 1/31/2022 1/31/2022   Did any of your first-degree relatives have breast or ovarian cancer? No No   Did any of your relatives have bilateral breast cancer? No No   Did any man in your family have breast cancer? No No   Did any woman in your family have breast and ovarian cancer? No No   Did any woman in your family have breast cancer before age 50 y? No No   Do you have 2 or more relatives with breast and/or ovarian cancer? No No   Do you have 2 or more relatives with breast and/or bowel cancer? No No         Pertinent mammograms are reviewed under the imaging tab.    History of abnormal Pap smear: NO - age 30-65 PAP every 5 years with negative HPV co-testing recommended     Reviewed and updated as needed this visit by clinical staff   Tobacco  Allergies  " Meds      Soc Hx        Reviewed and updated as needed this visit by Provider                 Past Medical History:   Diagnosis Date     Gestational thrombocytopenia (H)      Vaginal delivery     x4      Past Surgical History:   Procedure Laterality Date     AS LAP INCISIONAL HERNIA REPAIR  12/22/2017 12/22/2017,36164.0,DE REPAIR INCISIONAL HERNIA REDUCIBLE     REMOVE FISTULA ANAL  05/2015    ,Dr Rodrigues       Review of Systems  CONSTITUTIONAL: NEGATIVE for fever, chills, change in weight  INTEGUMENTARU/SKIN: NEGATIVE for worrisome rashes, moles or lesions  EYES:reading glasses   ENT: NEGATIVE for ear, mouth and throat problems; dentist is up to date   RESP: NEGATIVE for significant cough or SOB  BREAST: NEGATIVE for masses, tenderness or discharge  CV: NEGATIVE for chest pain, palpitations or peripheral edema  GI: NEGATIVE for nausea, abdominal pain, heartburn, or change in bowel habits  : NEGATIVE for unusual urinary or vaginal symptoms. Periods are regular pretty much   MUSCULOSKELETAL: see above   NEURO: menstrual related headaches  - lays down, takes excedrin    PSYCHIATRIC: NEGATIVE for changes in mood or affect     OBJECTIVE:   /76 (BP Location: Right arm, Patient Position: Sitting, Cuff Size: Adult Regular)   Pulse 89   Temp 98  F (36.7  C) (Tympanic)   Resp 16   Ht 1.702 m (5' 7\")   Wt 64.3 kg (141 lb 12.8 oz)   LMP 03/09/2022   SpO2 99%   Breastfeeding No   BMI 22.21 kg/m    Physical Exam  GENERAL: healthy, alert and no distress  EYES: Eyes grossly normal to inspection, PERRL and conjunctivae and sclerae normal  HENT: ear canals and TM's normal, nose and mouth without ulcers or lesions  NECK: no adenopathy, no asymmetry, masses, or scars and thyroid normal to palpation  RESP: lungs clear to auscultation - no rales, rhonchi or wheezes  BREAST: normal without masses, tenderness or nipple discharge and no palpable axillary masses or adenopathy  CV: regular rate and rhythm, normal S1 " "S2, no S3 or S4, no murmur, click or rub, trace peripheral edema and peripheral pulses strong  ABDOMEN: soft, nontender, no hepatosplenomegaly, no masses and bowel sounds normal  : Normal external genitalia.  On speculum exam there is no cervical or vaginal discharge.  When Pap smear is obtained, trace bleeding present.  On bimanual exam uterus is nonenlarged, nontender.  MS: no gross musculoskeletal defects noted, no edema  SKIN: no suspicious lesions or rashes  NEURO: Normal strength and tone, mentation intact and speech normal  PSYCH: mentation appears normal, affect normal/bright        ASSESSMENT/PLAN:       ICD-10-CM    1. Physical exam, annual  Z00.00 Pap Screen with HPV - recommended age 30 - 65 years     CBC W PLT No Diff     Ferritin     Comprehensive Metabolic Panel     Vitamin B12     TSH   2. Right leg paresthesias  R20.2 CBC W PLT No Diff     Ferritin     Comprehensive Metabolic Panel     Vitamin B12     TSH       1.  Pap smear obtained  2.  Flu and COVID vaccines declined  3.  Differential diagnosis of her right leg symptoms discussed.  This could include superficial paresthesias, restless leg syndrome, metabolic abnormality.  Labs obtained today related to her symptoms include CBC, ferritin, metabolic panel, B12, and thyroid.  Follow-up recommendations will be made based on results  4.  Mammogram up-to-date.  Consider colon cancer screening age 45    COUNSELING:      Estimated body mass index is 22.21 kg/m  as calculated from the following:    Height as of this encounter: 1.702 m (5' 7\").    Weight as of this encounter: 64.3 kg (141 lb 12.8 oz).        She reports that she has never smoked. She has never used smokeless tobacco.      Counseling Resources:  ATP IV Guidelines  Pooled Cohorts Equation Calculator  Breast Cancer Risk Calculator  BRCA-Related Cancer Risk Assessment: FHS-7 Tool  FRAX Risk Assessment  ICSI Preventive Guidelines  Dietary Guidelines for Americans, 2010  USDA's MyPlate  ASA " Prophylaxis  Lung CA Screening    ANGELICA WHITMORE MD  [facility] AND [facility]

## 2022-03-25 NOTE — NURSING NOTE
"Chief Complaint   Patient presents with     Physical     Patient is here for annual physical     Initial /76 (BP Location: Right arm, Patient Position: Sitting, Cuff Size: Adult Regular)   Pulse 89   Temp 98  F (36.7  C) (Tympanic)   Resp 16   Ht 1.702 m (5' 7\")   Wt 64.3 kg (141 lb 12.8 oz)   LMP 03/09/2022   SpO2 99%   Breastfeeding No   BMI 22.21 kg/m   Estimated body mass index is 22.21 kg/m  as calculated from the following:    Height as of this encounter: 1.702 m (5' 7\").    Weight as of this encounter: 64.3 kg (141 lb 12.8 oz).  Medication Reconciliation: complete    So Corona MA  " Regular rate and rhythm, Heart sounds S1 S2 present, no murmurs, rubs or gallops

## 2022-03-31 LAB
BKR LAB AP GYN ADEQUACY: NORMAL
BKR LAB AP GYN INTERPRETATION: NORMAL
BKR LAB AP HPV REFLEX: NORMAL
BKR LAB AP PREVIOUS ABNORMAL: NORMAL
PATH REPORT.COMMENTS IMP SPEC: NORMAL
PATH REPORT.COMMENTS IMP SPEC: NORMAL
PATH REPORT.RELEVANT HX SPEC: NORMAL

## 2022-04-04 LAB
HUMAN PAPILLOMA VIRUS 16 DNA: NEGATIVE
HUMAN PAPILLOMA VIRUS 18 DNA: NEGATIVE
HUMAN PAPILLOMA VIRUS FINAL DIAGNOSIS: NORMAL
HUMAN PAPILLOMA VIRUS OTHER HR: NEGATIVE

## 2022-08-10 ENCOUNTER — LAB (OUTPATIENT)
Dept: LAB | Facility: OTHER | Age: 42
End: 2022-08-10
Attending: FAMILY MEDICINE
Payer: COMMERCIAL

## 2022-08-10 DIAGNOSIS — R79.0 LOW FERRITIN: ICD-10-CM

## 2022-08-10 DIAGNOSIS — R20.2 RIGHT LEG PARESTHESIAS: ICD-10-CM

## 2022-08-10 LAB — FERRITIN SERPL-MCNC: 20 NG/ML (ref 24–336)

## 2022-08-10 PROCEDURE — 36415 COLL VENOUS BLD VENIPUNCTURE: CPT | Mod: ZL

## 2022-08-10 PROCEDURE — 82728 ASSAY OF FERRITIN: CPT | Mod: ZL

## 2022-09-17 ENCOUNTER — HEALTH MAINTENANCE LETTER (OUTPATIENT)
Age: 42
End: 2022-09-17

## 2023-01-27 ENCOUNTER — MYC MEDICAL ADVICE (OUTPATIENT)
Dept: FAMILY MEDICINE | Facility: OTHER | Age: 43
End: 2023-01-27
Payer: COMMERCIAL

## 2023-02-10 ENCOUNTER — HOSPITAL ENCOUNTER (OUTPATIENT)
Dept: MAMMOGRAPHY | Facility: OTHER | Age: 43
Discharge: HOME | End: 2023-02-10
Attending: FAMILY MEDICINE | Admitting: FAMILY MEDICINE
Payer: COMMERCIAL

## 2023-02-10 DIAGNOSIS — Z12.31 VISIT FOR SCREENING MAMMOGRAM: ICD-10-CM

## 2023-02-10 PROCEDURE — 77067 SCR MAMMO BI INCL CAD: CPT

## 2023-03-03 ENCOUNTER — HOSPITAL ENCOUNTER (OUTPATIENT)
Dept: ULTRASOUND IMAGING | Facility: OTHER | Age: 43
Discharge: HOME | End: 2023-03-03
Attending: FAMILY MEDICINE
Payer: COMMERCIAL

## 2023-03-03 ENCOUNTER — HOSPITAL ENCOUNTER (OUTPATIENT)
Dept: MAMMOGRAPHY | Facility: OTHER | Age: 43
Discharge: HOME | End: 2023-03-03
Attending: FAMILY MEDICINE
Payer: COMMERCIAL

## 2023-03-03 DIAGNOSIS — R92.8 ABNORMAL FINDING ON BREAST IMAGING: ICD-10-CM

## 2023-03-03 PROCEDURE — 76642 ULTRASOUND BREAST LIMITED: CPT | Mod: LT

## 2023-03-03 PROCEDURE — 77061 BREAST TOMOSYNTHESIS UNI: CPT | Mod: LT

## 2023-03-24 NOTE — PROGRESS NOTES
Pre-Visit Planning   Next 5 appointments (look out 90 days)    Mar 29, 2023  8:00 AM  PHYSICAL with Catherine Isabel MD  [facility] ([facility] ) [address]  [place] [id number] [phone]        Appointment Notes for this encounter:   PX    Questionnaires Reviewed/Assigned  Additional questionnaires assigned: PHQ-2    Patient preferred phone number: [phone]    Unable to reach. Left voicemail. Advised patient to call clinic back at [phone]. [phone]

## 2023-03-29 ENCOUNTER — OFFICE VISIT (OUTPATIENT)
Dept: FAMILY MEDICINE | Facility: OTHER | Age: 43
End: 2023-03-29
Attending: FAMILY MEDICINE
Payer: COMMERCIAL

## 2023-03-29 VITALS
OXYGEN SATURATION: 99 % | BODY MASS INDEX: 22 KG/M2 | SYSTOLIC BLOOD PRESSURE: 128 MMHG | TEMPERATURE: 97.6 F | HEIGHT: 67 IN | WEIGHT: 140.2 LBS | RESPIRATION RATE: 14 BRPM | HEART RATE: 86 BPM | DIASTOLIC BLOOD PRESSURE: 82 MMHG

## 2023-03-29 DIAGNOSIS — R79.0 LOW FERRITIN: ICD-10-CM

## 2023-03-29 DIAGNOSIS — Z00.00 PHYSICAL EXAM, ANNUAL: Primary | ICD-10-CM

## 2023-03-29 DIAGNOSIS — R20.2 RIGHT LEG PARESTHESIAS: ICD-10-CM

## 2023-03-29 LAB
ALBUMIN SERPL BCG-MCNC: 4.2 G/DL (ref 3.5–5.2)
ALP SERPL-CCNC: 55 U/L (ref 35–104)
ALT SERPL W P-5'-P-CCNC: 7 U/L (ref 10–35)
ANION GAP SERPL CALCULATED.3IONS-SCNC: 6 MMOL/L (ref 7–15)
AST SERPL W P-5'-P-CCNC: 14 U/L (ref 10–35)
BILIRUB SERPL-MCNC: 0.4 MG/DL
BUN SERPL-MCNC: 14.2 MG/DL (ref 6–20)
CALCIUM SERPL-MCNC: 8.9 MG/DL (ref 8.6–10)
CHLORIDE SERPL-SCNC: 106 MMOL/L (ref 98–107)
CHOLEST SERPL-MCNC: 158 MG/DL
CREAT SERPL-MCNC: 0.7 MG/DL (ref 0.51–0.95)
DEPRECATED HCO3 PLAS-SCNC: 28 MMOL/L (ref 22–29)
ERYTHROCYTE [DISTWIDTH] IN BLOOD BY AUTOMATED COUNT: 12.3 % (ref 10–15)
FERRITIN SERPL-MCNC: 20 NG/ML (ref 6–175)
GFR SERPL CREATININE-BSD FRML MDRD: >90 ML/MIN/1.73M2
GLUCOSE SERPL-MCNC: 84 MG/DL (ref 70–99)
HCT VFR BLD AUTO: 37.5 % (ref 35–47)
HDLC SERPL-MCNC: 74 MG/DL
HGB BLD-MCNC: 12.7 G/DL (ref 11.7–15.7)
IRON SERPL-MCNC: 57 UG/DL (ref 37–145)
LDLC SERPL CALC-MCNC: 75 MG/DL
MAGNESIUM SERPL-MCNC: 2 MG/DL (ref 1.7–2.3)
MCH RBC QN AUTO: 31.8 PG (ref 26.5–33)
MCHC RBC AUTO-ENTMCNC: 33.9 G/DL (ref 31.5–36.5)
MCV RBC AUTO: 94 FL (ref 78–100)
NONHDLC SERPL-MCNC: 84 MG/DL
PLATELET # BLD AUTO: 143 10E3/UL (ref 150–450)
POTASSIUM SERPL-SCNC: 4.3 MMOL/L (ref 3.4–5.3)
PROT SERPL-MCNC: 7.2 G/DL (ref 6.4–8.3)
RBC # BLD AUTO: 3.99 10E6/UL (ref 3.8–5.2)
SODIUM SERPL-SCNC: 140 MMOL/L (ref 136–145)
TRIGL SERPL-MCNC: 43 MG/DL
TSH SERPL DL<=0.005 MIU/L-ACNC: 2.75 UIU/ML (ref 0.3–4.2)
WBC # BLD AUTO: 3.5 10E3/UL (ref 4–11)

## 2023-03-29 PROCEDURE — 99396 PREV VISIT EST AGE 40-64: CPT | Performed by: FAMILY MEDICINE

## 2023-03-29 PROCEDURE — 84443 ASSAY THYROID STIM HORMONE: CPT | Mod: ZL | Performed by: FAMILY MEDICINE

## 2023-03-29 PROCEDURE — 85027 COMPLETE CBC AUTOMATED: CPT | Mod: ZL | Performed by: FAMILY MEDICINE

## 2023-03-29 PROCEDURE — 80061 LIPID PANEL: CPT | Mod: ZL | Performed by: FAMILY MEDICINE

## 2023-03-29 PROCEDURE — 83735 ASSAY OF MAGNESIUM: CPT | Mod: ZL | Performed by: FAMILY MEDICINE

## 2023-03-29 PROCEDURE — 36415 COLL VENOUS BLD VENIPUNCTURE: CPT | Mod: ZL | Performed by: FAMILY MEDICINE

## 2023-03-29 PROCEDURE — 82728 ASSAY OF FERRITIN: CPT | Mod: ZL | Performed by: FAMILY MEDICINE

## 2023-03-29 PROCEDURE — 80053 COMPREHEN METABOLIC PANEL: CPT | Mod: ZL | Performed by: FAMILY MEDICINE

## 2023-03-29 PROCEDURE — 83540 ASSAY OF IRON: CPT | Mod: ZL | Performed by: FAMILY MEDICINE

## 2023-03-29 PROCEDURE — 82306 VITAMIN D 25 HYDROXY: CPT | Mod: ZL | Performed by: FAMILY MEDICINE

## 2023-03-29 ASSESSMENT — ENCOUNTER SYMPTOMS
COUGH: 0
ARTHRALGIAS: 0
BREAST MASS: 0
DIARRHEA: 0
JOINT SWELLING: 0
PALPITATIONS: 0
CHILLS: 0
WEAKNESS: 0
CONSTIPATION: 0
DYSURIA: 0
MYALGIAS: 0
NAUSEA: 0
SHORTNESS OF BREATH: 0
NERVOUS/ANXIOUS: 0
EYE PAIN: 0
DIZZINESS: 0
PARESTHESIAS: 0
HEARTBURN: 0
FEVER: 0
HEMATOCHEZIA: 0
ABDOMINAL PAIN: 0
FREQUENCY: 0
SORE THROAT: 0
HEMATURIA: 0
HEADACHES: 0

## 2023-03-29 ASSESSMENT — PAIN SCALES - GENERAL: PAINLEVEL: NO PAIN (0)

## 2023-03-29 NOTE — NURSING NOTE
"Chief Complaint   Patient presents with     Physical     Patient is here for annual physical. She would like to discuss vitamins.     Initial /82   Pulse 86   Temp 97.6  F (36.4  C) (Tympanic)   Resp 14   Ht 1.702 m (5' 7\")   Wt 63.6 kg (140 lb 3.2 oz)   LMP 03/15/2023 (Approximate)   SpO2 99%   Breastfeeding No   BMI 21.96 kg/m   Estimated body mass index is 21.96 kg/m  as calculated from the following:    Height as of this encounter: 1.702 m (5' 7\").    Weight as of this encounter: 63.6 kg (140 lb 3.2 oz).  Medication Reconciliation: complete    So Corona CMA       FOOD SECURITY SCREENING QUESTIONS:    The next two questions are to help us understand your food security.  If you are feeling you need any assistance in this area, we have resources available to support you today.    Hunger Vital Signs:  Within the past 12 months we worried whether our food would run out before we got money to buy more. Never  Within the past 12 months the food we bought just didn't last and we didn't have money to get more. Never  So Corona CMA,LPN on 3/29/2023 at 8:08 AM      "

## 2023-03-29 NOTE — PROGRESS NOTES
Answers for HPI/ROS submitted by the patient on 3/29/2023  Frequency of exercise:: 2-3 days/week  Getting at least 3 servings of Calcium per day:: Yes  Diet:: Regular (no restrictions)  Taking medications regularly:: Not Applicable  Medication side effects:: Not applicable  Bi-annual eye exam:: Yes  Dental care twice a year:: NO  Sleep apnea or symptoms of sleep apnea:: None  abdominal pain: No  Blood in stool: No  Blood in urine: No  chest pain: No  chills: No  congestion: No  constipation: No  cough: No  diarrhea: No  dizziness: No  ear pain: No  eye pain: No  nervous/anxious: No  fever: No  frequency: No  genital sores: No  headaches: No  hearing loss: No  heartburn: No  arthralgias: No  joint swelling: No  peripheral edema: No  mood changes: No  myalgias: No  nausea: No  dysuria: No  palpitations: No  Skin sensation changes: No  sore throat: No  urgency: No  rash: No  shortness of breath: No  visual disturbance: No  weakness: No  pelvic pain: No  vaginal bleeding: No  vaginal discharge: No  tenderness: Yes  breast mass: No  breast discharge: No  Additional concerns today:: No  Duration of exercise:: 30-45 minutes

## 2023-03-29 NOTE — PROGRESS NOTES
SUBJECTIVE:   CC: Kya is an 42 year old who presents for preventive health visit.   Additional Questions 3/29/2023   Roomed by MELISA Rizzo   Accompanied by Self     Healthy Habits:     Getting at least 3 servings of Calcium per day:  Yes    Bi-annual eye exam:  Yes    Dental care twice a year:  NO    Sleep apnea or symptoms of sleep apnea:  None    Diet:  Regular (no restrictions)    Frequency of exercise:  2-3 days/week    Duration of exercise:  30-45 minutes    Taking medications regularly:  Not Applicable    Medication side effects:  Not applicable    PHQ-2 Total Score: 0    Additional concerns today:  No      1.  Anxiety issues increased this winter.  Increased responsibility with her parents' health.  She had messaged me with concerns.  She gets good sleep, eats fine.  Physical symptoms of chest tightness, costochondritis is back, palpitations.    2.  Ferritin -difficulty tolerating iron therapy  3.  Tingling nerve symptoms - better but still some muscle symptoms  -wonders about magnesium dose        Today's PHQ-2 Score:   PHQ-2 ( 1999 Pfizer) 3/29/2023   Q1: Little interest or pleasure in doing things 0   Q2: Feeling down, depressed or hopeless 0   PHQ-2 Score 0   PHQ-2 Total Score (12-17 Years)- Positive if 3 or more points; Administer PHQ-A if positive -   Q1: Little interest or pleasure in doing things Not at all   Q2: Feeling down, depressed or hopeless Not at all   PHQ-2 Score 0           Social History     Tobacco Use     Smoking status: Never     Smokeless tobacco: Never   Substance Use Topics     Alcohol use: No         Alcohol Use 3/29/2023   Prescreen: >3 drinks/day or >7 drinks/week? Not Applicable     Reviewed orders with patient.  Reviewed health maintenance and updated orders accordingly - Yes  Lab work is in process    Breast Cancer Screening:  Any new diagnosis of family breast, ovarian, or bowel cancer? No    FHS-7:   Breast CA Risk Assessment (FHS-7) 1/31/2022 1/31/2022   Did any of  your first-degree relatives have breast or ovarian cancer? No No   Did any of your relatives have bilateral breast cancer? No No   Did any man in your family have breast cancer? No No   Did any woman in your family have breast and ovarian cancer? No No   Did any woman in your family have breast cancer before age 50 y? No No   Do you have 2 or more relatives with breast and/or ovarian cancer? No No   Do you have 2 or more relatives with breast and/or bowel cancer? No No       Mammogram Screening: Recommended annual mammography  Pertinent mammograms are reviewed under the imaging tab.    History of abnormal Pap smear: NO - age 30-65 PAP every 5 years with negative HPV co-testing recommended  PAP / HPV Latest Ref Rng & Units 3/25/2022   PAP   Negative for Intraepithelial Lesion or Malignancy (NILM)   HPV16 Negative Negative   HPV18 Negative Negative   HRHPV Negative Negative     Reviewed and updated as needed this visit by clinical staff   Tobacco  Allergies  Meds   Med Hx            Reviewed and updated as needed this visit by Provider       Med Hx           Past Medical History:   Diagnosis Date     Gestational thrombocytopenia (H)      Vaginal delivery     x4      Past Surgical History:   Procedure Laterality Date     AS LAP INCISIONAL HERNIA REPAIR  2017,57742.0,CT REPAIR INCISIONAL HERNIA REDUCIBLE     REMOVE FISTULA ANAL  2015    ,Dr Rodrigues     OB History    Para Term  AB Living   4 4 4 0 0 4   SAB IAB Ectopic Multiple Live Births   0 0 0 0 4      # Outcome Date GA Lbr Chris/2nd Weight Sex Delivery Anes PTL Lv   4 Term 16 40w5d  3.416 kg (7 lb 8.5 oz) F  None  LAURA      Name: Arminda      Apgar1: 8  Apgar5: 9   3 Term 02/09/10    M Vag-Spont   LAURA      Name: Yash   2 Term 07    M Vag-Spont   LAURA      Name: Carsyn   1 Term 04    M Vag-Spont   LAURA      Name: [place]       Review of Systems   Constitutional: Negative for chills and fever.   HENT: Negative  "for congestion, ear pain, hearing loss and sore throat.    Eyes: Negative for pain and visual disturbance.   Respiratory: Negative for cough and shortness of breath.    Cardiovascular: Negative for chest pain, palpitations and peripheral edema.   Gastrointestinal: Negative for abdominal pain, constipation, diarrhea, heartburn, hematochezia and nausea.   Breasts:  Positive for tenderness. Negative for breast mass and discharge.   Genitourinary: Negative for dysuria, frequency, genital sores, hematuria, pelvic pain, urgency, vaginal bleeding and vaginal discharge.   Musculoskeletal: Negative for arthralgias, joint swelling and myalgias.   Skin: Negative for rash.   Neurological: Negative for dizziness, weakness, headaches and paresthesias.   Psychiatric/Behavioral: Negative for mood changes. The patient is not nervous/anxious.      Recent abnormal mammogram with follow-up     OBJECTIVE:   /82   Pulse 86   Temp 97.6  F (36.4  C) (Tympanic)   Resp 14   Ht 1.702 m (5' 7\")   Wt 63.6 kg (140 lb 3.2 oz)   LMP 03/15/2023 (Approximate)   SpO2 99%   Breastfeeding No   BMI 21.96 kg/m    Physical Exam  Vitals and nursing note reviewed.   Constitutional:       Appearance: Normal appearance. She is normal weight.   HENT:      Head: Normocephalic and atraumatic.      Right Ear: Tympanic membrane normal.      Left Ear: Tympanic membrane normal.      Nose: Nose normal.   Eyes:      Pupils: Pupils are equal, round, and reactive to light.   Cardiovascular:      Rate and Rhythm: Normal rate and regular rhythm.   Pulmonary:      Effort: Pulmonary effort is normal.      Breath sounds: Normal breath sounds.   Chest:      Chest wall: Tenderness present. No mass.      Comments: Right costochondral tenderness  Abdominal:      General: Abdomen is flat.      Palpations: Abdomen is soft.   Musculoskeletal:         General: No tenderness.   Lymphadenopathy:      Upper Body:      Right upper body: No axillary adenopathy.      Left " upper body: No axillary adenopathy.   Skin:     Findings: No rash.   Neurological:      General: No focal deficit present.      Mental Status: She is alert.   Psychiatric:         Mood and Affect: Mood normal.         Behavior: Behavior normal.         Thought Content: Thought content normal.         Judgment: Judgment normal.               ASSESSMENT/PLAN:       ICD-10-CM    1. Physical exam, annual  Z00.00 Comprehensive Metabolic Panel     Vitamin D Total     Lipid Profile     TSH     CBC W PLT No Diff     Ferritin     Iron     Magnesium     Comprehensive Metabolic Panel     Vitamin D Total     Lipid Profile     TSH     CBC W PLT No Diff     Ferritin     Iron     Magnesium      2. Right leg paresthesias  R20.2       3. Low ferritin  R79.0 Ferritin     Iron     Ferritin     Iron        1.  Flu and COVID vaccines declined.  Healthcare maintenance is otherwise up-to-date.  2.  Labs due today include labs for monitoring of decreased ferritin.  Also check thyroid, metabolic panel, CBC, vitamin D, magnesium.  Once results are back, will contact patient with these results and iron and magnesium dosing.  3.  Discussed behavioral strategies for helping to manage anxiety.  She does exercise regularly, is sleeping and eating fine.  We discussed active distraction and in particular distraction from worry about physical symptoms of anxiety.  We also discussed how perimenopausal symptoms can contribute to this.          COUNSELING:  Reviewed preventive health counseling, as reflected in patient instructions        She reports that she has never smoked. She has never used smokeless tobacco.      ANGELICA WHITMORE MD  [facility] AND [facility]

## 2023-03-30 LAB — DEPRECATED CALCIDIOL+CALCIFEROL SERPL-MC: 43 UG/L (ref 20–75)

## 2024-03-25 ENCOUNTER — HOSPITAL ENCOUNTER (OUTPATIENT)
Dept: MAMMOGRAPHY | Facility: OTHER | Age: 44
Discharge: HOME | End: 2024-03-25
Attending: FAMILY MEDICINE | Admitting: FAMILY MEDICINE
Payer: COMMERCIAL

## 2024-03-25 DIAGNOSIS — Z12.31 VISIT FOR SCREENING MAMMOGRAM: ICD-10-CM

## 2024-03-25 PROCEDURE — 77063 BREAST TOMOSYNTHESIS BI: CPT

## 2024-05-04 ENCOUNTER — HEALTH MAINTENANCE LETTER (OUTPATIENT)
Age: 44
End: 2024-05-04

## 2024-09-30 ENCOUNTER — OFFICE VISIT (OUTPATIENT)
Dept: FAMILY MEDICINE | Facility: OTHER | Age: 44
End: 2024-09-30
Attending: FAMILY MEDICINE
Payer: COMMERCIAL

## 2024-09-30 VITALS
BODY MASS INDEX: 21.19 KG/M2 | DIASTOLIC BLOOD PRESSURE: 78 MMHG | HEART RATE: 71 BPM | OXYGEN SATURATION: 99 % | WEIGHT: 139.8 LBS | TEMPERATURE: 97.2 F | SYSTOLIC BLOOD PRESSURE: 124 MMHG | RESPIRATION RATE: 14 BRPM | HEIGHT: 68 IN

## 2024-09-30 DIAGNOSIS — Z12.11 COLON CANCER SCREENING: ICD-10-CM

## 2024-09-30 DIAGNOSIS — Z00.00 PHYSICAL EXAM, ANNUAL: Primary | ICD-10-CM

## 2024-09-30 DIAGNOSIS — D50.0 IRON DEFICIENCY ANEMIA DUE TO CHRONIC BLOOD LOSS: Primary | ICD-10-CM

## 2024-09-30 DIAGNOSIS — R20.2 RIGHT LEG PARESTHESIAS: ICD-10-CM

## 2024-09-30 LAB
ERYTHROCYTE [DISTWIDTH] IN BLOOD BY AUTOMATED COUNT: 14.3 % (ref 10–15)
FERRITIN SERPL-MCNC: 8 NG/ML (ref 6–175)
HCT VFR BLD AUTO: 35.4 % (ref 35–47)
HGB BLD-MCNC: 11 G/DL (ref 11.7–15.7)
IRON SERPL-MCNC: 27 UG/DL (ref 37–145)
MCH RBC QN AUTO: 28 PG (ref 26.5–33)
MCHC RBC AUTO-ENTMCNC: 31.1 G/DL (ref 31.5–36.5)
MCV RBC AUTO: 90 FL (ref 78–100)
PLATELET # BLD AUTO: 161 10E3/UL (ref 150–450)
RBC # BLD AUTO: 3.93 10E6/UL (ref 3.8–5.2)
WBC # BLD AUTO: 3.7 10E3/UL (ref 4–11)

## 2024-09-30 PROCEDURE — 83540 ASSAY OF IRON: CPT | Mod: ZL | Performed by: FAMILY MEDICINE

## 2024-09-30 PROCEDURE — 82728 ASSAY OF FERRITIN: CPT | Mod: ZL | Performed by: FAMILY MEDICINE

## 2024-09-30 PROCEDURE — 82306 VITAMIN D 25 HYDROXY: CPT | Mod: ZL | Performed by: FAMILY MEDICINE

## 2024-09-30 PROCEDURE — 36415 COLL VENOUS BLD VENIPUNCTURE: CPT | Mod: ZL | Performed by: FAMILY MEDICINE

## 2024-09-30 PROCEDURE — 85027 COMPLETE CBC AUTOMATED: CPT | Mod: ZL | Performed by: FAMILY MEDICINE

## 2024-09-30 PROCEDURE — 99396 PREV VISIT EST AGE 40-64: CPT | Performed by: FAMILY MEDICINE

## 2024-09-30 RX ORDER — FERROUS GLUCONATE 324(38)MG
324 TABLET ORAL
Qty: 90 TABLET | Refills: 3 | Status: SHIPPED | OUTPATIENT
Start: 2024-09-30

## 2024-09-30 SDOH — HEALTH STABILITY: PHYSICAL HEALTH: ON AVERAGE, HOW MANY DAYS PER WEEK DO YOU ENGAGE IN MODERATE TO STRENUOUS EXERCISE (LIKE A BRISK WALK)?: 5 DAYS

## 2024-09-30 SDOH — HEALTH STABILITY: PHYSICAL HEALTH: ON AVERAGE, HOW MANY MINUTES DO YOU ENGAGE IN EXERCISE AT THIS LEVEL?: 40 MIN

## 2024-09-30 ASSESSMENT — SOCIAL DETERMINANTS OF HEALTH (SDOH): HOW OFTEN DO YOU GET TOGETHER WITH FRIENDS OR RELATIVES?: THREE TIMES A WEEK

## 2024-09-30 NOTE — PATIENT INSTRUCTIONS
Vitamin D  - best if taken with food; about 1000 units a day     Glucosamine 1500mg total a day  (Move Free)   - for 3 months      Checking iron and hemoglobin today

## 2024-09-30 NOTE — PROGRESS NOTES
Preventive Care Visit  [facility] AND [facility]  ANGELICA WHITMORE MD, Family Medicine  Sep 30, 2024      Assessment & Plan       ICD-10-CM    1. Physical exam, annual  Z00.00 Ferritin     CBC W PLT No Diff     Iron     Vitamin D Total     Ferritin     CBC W PLT No Diff     Iron     Vitamin D Total      2. Colon cancer screening  Z12.11 Colonoscopy Screening  Referral      3. Right leg paresthesias  R20.2            Colon cancer screening options discussed - colonoscopy vs cologuard and she will proceed with colonoscopy  Mammogram and pap smear are up to date  Labs today - vitamin D, iron and ferritin.  Has had low ferritin in the past and a history of gestational thrombocytopenia           Counseling  Appropriate preventive services were addressed with this patient via screening, questionnaire, or discussion as appropriate for fall prevention, nutrition, physical activity, Tobacco-use cessation, social engagement, weight loss and cognition.  Checklist reviewing preventive services available has been given to the patient.  Reviewed patient's diet, addressing concerns and/or questions.   The patient was instructed to see the dentist every 6 months.   She is at risk for psychosocial distress and has been provided with information to reduce risk.       Angelica Sanders MD     Komal Boland is a 44 year old, presenting for the following:  Physical (Annual well visit)        9/30/2024     1:18 PM   Additional Questions   Roomed by Isabel ZHOU LPN        Health Care Directive  Patient does not have a Health Care Directive or Living Will: Discussed advance care planning with patient; however, patient declined at this time.    JAYESH Herring is a 44 year old female in for annual exam   Not exercising due to needing to care for her aging parents  Eye twitching   Numb patch on lateral side of her calves; if wears longer socks she will notice a line from them    Change in period  - one  day is heavier and she feels almost like she should stay home, cycles used to be 40+ days and now are 26; she's had a few hot/warm nights and sometimes gets menstrual migraines   5.  Left lateral knee tenderness, used to be on the right primarily; walking makes it better.  About 12 years ago went for therapy.                9/30/2024   General Health   How would you rate your overall physical health? Good   Feel stress (tense, anxious, or unable to sleep) Only a little      (!) STRESS CONCERN      9/30/2024   Nutrition   Three or more servings of calcium each day? Yes   Diet: Regular (no restrictions)   How many servings of fruit and vegetables per day? (!) 2-3   How many sweetened beverages each day? 0-1            9/30/2024   Exercise   Days per week of moderate/strenous exercise 5 days   Average minutes spent exercising at this level 40 min            9/30/2024   Social Factors   Frequency of gathering with friends or relatives Three times a week   Worry food won't last until get money to buy more No   Food not last or not have enough money for food? No   Do you have housing? (Housing is defined as stable permanent housing and does not include staying ouside in a car, in a tent, in an abandoned building, in an overnight shelter, or couch-surfing.) Yes   Are you worried about losing your housing? No   Lack of transportation? No   Unable to get utilities (heat,electricity)? No            9/30/2024   Dental   Dentist two times every year? (!) NO            9/30/2024   TB Screening   Were you born outside of the [place]? No            Today's PHQ-2 Score:       9/30/2024    12:59 PM   PHQ-2 ( 1999 [employer])   Q1: Little interest or pleasure in doing things 0   Q2: Feeling down, depressed or hopeless 0   PHQ-2 Score 0   Q1: Little interest or pleasure in doing things Not at all   Q2: Feeling down, depressed or hopeless Not at all   PHQ-2 Score 0           9/30/2024   Substance Use   Alcohol more than 3/day or more than 7/wk  Not Applicable   Do you use any other substances recreationally? No        Social History     Tobacco Use    Smoking status: Never     Passive exposure: Never    Smokeless tobacco: Never   Vaping Use    Vaping status: Never Used   Substance Use Topics    Alcohol use: No    Drug use: Never           3/25/2024   LAST FHS-7 RESULTS   1st degree relative breast or ovarian cancer No   Any relative bilateral breast cancer No   Any male have breast cancer No   Any ONE woman have BOTH breast AND ovarian cancer No        Mammogram Screening - Mammogram every 1-2 years updated in Health Maintenance based on mutual decision making        2024   STI Screening   New sexual partner(s) since last STI/HIV test? No        History of abnormal Pap smear: No - age 30- 64 PAP with HPV every 5 years recommended        Latest Ref Rng & Units 3/25/2022     1:52 PM   PAP / HPV   PAP  Negative for Intraepithelial Lesion or Malignancy (NILM)    HPV 16 DNA Negative Negative    HPV 18 DNA Negative Negative    Other HR HPV Negative Negative      ASCVD Risk   The 10-year ASCVD risk score (Nicolasa CORREIA, et al., 2019) is: 0.4%    Values used to calculate the score:      Age: 44 years      Sex: Female      Is Non- : No      Diabetic: No      Tobacco smoker: No      Systolic Blood Pressure: 124 mmHg      Is BP treated: No      HDL Cholesterol: 74 mg/dL      Total Cholesterol: 158 mg/dL        2024   Contraception/Family Planning   Questions about contraception or family planning No           Reviewed and updated as needed this visit by Provider                    Past Medical History:   Diagnosis Date    Gestational thrombocytopenia (H)     Vaginal delivery     x4     Past Surgical History:   Procedure Laterality Date    AS LAP INCISIONAL HERNIA REPAIR  2017,43122.0,[place] REPAIR INCISIONAL HERNIA REDUCIBLE    REMOVE FISTULA ANAL  2015    ,Dr Rodrigues     OB History    Para Term  " AB Living   4 4 4 0 0 4   SAB IAB Ectopic Multiple Live Births   0 0 0 0 4      # Outcome Date GA Lbr Chris/2nd Weight Sex Type Anes PTL Lv   4 Term 16 40w5d  3.416 kg (7 lb 8.5 oz) F  None  LAURA      Name: Arminda      Apgar1: 8  Apgar5: 9   3 Term 02/09/10    M Vag-Spont   LAURA      Name: Yash   2 Term 07    M Vag-Spont   LAURA      Name: Juan Francisco   1 Term 04    M Vag-Spont   LAURA      Name: [place]         Review of Systems  Eyes - every 2 years  Dentist - each year   GI - no heartburn  Bladder - rare symptoms   Derm - no new changes      Objective    Exam  /78 (BP Location: Right arm, Patient Position: Sitting, Cuff Size: Adult Regular)   Pulse 71   Temp 97.2  F (36.2  C) (Temporal)   Resp 14   Ht 1.715 m (5' 7.5\")   Wt 63.4 kg (139 lb 12.8 oz)   LMP 2024 (Approximate)   SpO2 99%   Breastfeeding No   BMI 21.57 kg/m     Estimated body mass index is 21.57 kg/m  as calculated from the following:    Height as of this encounter: 1.715 m (5' 7.5\").    Weight as of this encounter: 63.4 kg (139 lb 12.8 oz).    Physical Exam  GENERAL: alert and no distress  EYES: Eyes grossly normal to inspection, PERRL and conjunctivae and sclerae normal  HENT: ear canals and TM's normal, nose and mouth without ulcers or lesions  NECK: no adenopathy, no asymmetry, masses, or scars  RESP: lungs clear to auscultation - no rales, rhonchi or wheezes  CV: regular rate and rhythm, normal S1 S2, no S3 or S4, no murmur, click or rub, no peripheral edema  ABDOMEN: soft, nontender, no hepatosplenomegaly, no masses and bowel sounds normal  MS: no gross musculoskeletal defects noted, no edema  SKIN: no suspicious lesions or rashes  NEURO: decreased sensation to pin prick lateral calves bilaterally   PSYCH: mentation appears normal, affect normal/bright        Signed Electronically by: ANGELICA WHITMORE MD    "

## 2024-09-30 NOTE — NURSING NOTE
"Chief Complaint   Patient presents with    Physical     Annual well visit       Initial /78 (BP Location: Right arm, Patient Position: Sitting, Cuff Size: Adult Regular)   Pulse 71   Temp 97.2  F (36.2  C) (Temporal)   Resp 14   Ht 1.715 m (5' 7.5\")   Wt 63.4 kg (139 lb 12.8 oz)   LMP 09/26/2024 (Approximate)   SpO2 99%   Breastfeeding No   BMI 21.57 kg/m   Estimated body mass index is 21.57 kg/m  as calculated from the following:    Height as of this encounter: 1.715 m (5' 7.5\").    Weight as of this encounter: 63.4 kg (139 lb 12.8 oz).    Medication Review: complete    The next two questions are to help us understand your food security.  If you are feeling you need any assistance in this area, we have resources available to support you today.          9/30/2024   SDOH- Food Insecurity   Within the past 12 months, did you worry that your food would run out before you got money to buy more? N   Within the past 12 months, did the food you bought just not last and you didn t have money to get more? N        Health Care Directive:  Patient does not have a Health Care Directive or Living Will: Discussed advance care planning with patient; however, patient declined at this time.    Isabel Florian LPN      "

## 2024-10-01 DIAGNOSIS — Z12.11 SPECIAL SCREENING FOR MALIGNANT NEOPLASMS, COLON: Primary | ICD-10-CM

## 2024-10-01 LAB — VIT D+METAB SERPL-MCNC: 30 NG/ML (ref 20–50)

## 2024-10-01 RX ORDER — POLYETHYLENE GLYCOL 3350, SODIUM CHLORIDE, SODIUM BICARBONATE, POTASSIUM CHLORIDE 420; 11.2; 5.72; 1.48 G/4L; G/4L; G/4L; G/4L
4000 POWDER, FOR SOLUTION ORAL ONCE
Qty: 4000 ML | Refills: 0 | Status: SHIPPED | OUTPATIENT
Start: 2024-10-01 | End: 2024-10-01

## 2024-10-01 RX ORDER — BISACODYL 5 MG/1
TABLET, DELAYED RELEASE ORAL
Qty: 2 TABLET | Refills: 0 | Status: SHIPPED | OUTPATIENT
Start: 2024-10-01

## 2024-10-01 NOTE — TELEPHONE ENCOUNTER
Screening Questions for the Scheduling of Screening Colonoscopies   (If Colonoscopy is diagnostic, Provider should review the chart before scheduling.)  Are you younger than 45 or older than 80?  NO  Do you take aspirin or fish oil?  NO (if yes, tell patient to stop 1 week prior to Colonoscopy)  Do you take warfarin (Coumadin), clopidogrel (Plavix), apixaban (Eliquis), dabigatram (Pradaxa), rivaroxaban (Xarelto) or any blood thinner? NO  Do you take semaglutide (Ozempic or Wegovy), tirzepatide (Mounjaro or Zepbound), liraglutide (Victoza), or dulaglutide (Trulicity)? NO  Do you use oxygen or a CPAP at home?  NO  Do you have kidney disease? NO  Are you on dialysis? NO  Have you had a stroke or heart attack in the last year? NO  Have you had a stent in your heart or any blood vessel in the last year? NO  Have you had a transplant of any organ? NO  Have you had a colonoscopy or upper endoscopy (EGD) before? NO  Date of scheduled Colonoscopy. 01/08/2025  Provider [facility]

## 2024-12-03 ENCOUNTER — LAB (OUTPATIENT)
Dept: LAB | Facility: OTHER | Age: 44
End: 2024-12-03
Payer: COMMERCIAL

## 2024-12-03 DIAGNOSIS — D50.0 IRON DEFICIENCY ANEMIA DUE TO CHRONIC BLOOD LOSS: ICD-10-CM

## 2024-12-03 LAB
ERYTHROCYTE [DISTWIDTH] IN BLOOD BY AUTOMATED COUNT: 14.6 % (ref 10–15)
FERRITIN SERPL-MCNC: 33 NG/ML (ref 6–175)
HCT VFR BLD AUTO: 39.6 % (ref 35–47)
HGB BLD-MCNC: 12.8 G/DL (ref 11.7–15.7)
IRON BINDING CAPACITY (ROCHE): 314 UG/DL (ref 240–430)
IRON SATN MFR SERPL: 23 % (ref 15–46)
IRON SERPL-MCNC: 72 UG/DL (ref 37–145)
MCH RBC QN AUTO: 30.4 PG (ref 26.5–33)
MCHC RBC AUTO-ENTMCNC: 32.3 G/DL (ref 31.5–36.5)
MCV RBC AUTO: 94 FL (ref 78–100)
PLATELET # BLD AUTO: 186 10E3/UL (ref 150–450)
RBC # BLD AUTO: 4.21 10E6/UL (ref 3.8–5.2)
WBC # BLD AUTO: 5.7 10E3/UL (ref 4–11)

## 2024-12-03 PROCEDURE — 85014 HEMATOCRIT: CPT | Mod: ZL

## 2024-12-03 PROCEDURE — 83540 ASSAY OF IRON: CPT | Mod: ZL

## 2024-12-03 PROCEDURE — 83550 IRON BINDING TEST: CPT | Mod: ZL

## 2024-12-03 PROCEDURE — 82728 ASSAY OF FERRITIN: CPT | Mod: ZL

## 2024-12-03 PROCEDURE — 36415 COLL VENOUS BLD VENIPUNCTURE: CPT | Mod: ZL

## 2024-12-30 RX ORDER — FAMOTIDINE 20 MG
1 TABLET ORAL DAILY
COMMUNITY

## 2024-12-30 RX ORDER — MULTIVIT WITH MINERALS/LUTEIN
1000 TABLET ORAL DAILY
COMMUNITY

## 2025-01-08 ENCOUNTER — ANESTHESIA (OUTPATIENT)
Dept: SURGERY | Facility: OTHER | Age: 45
End: 2025-01-08
Payer: MEDICAID

## 2025-01-08 ENCOUNTER — ANESTHESIA EVENT (OUTPATIENT)
Dept: SURGERY | Facility: OTHER | Age: 45
End: 2025-01-08
Payer: MEDICAID

## 2025-01-08 ENCOUNTER — HOSPITAL ENCOUNTER (OUTPATIENT)
Facility: OTHER | Age: 45
Discharge: HOME | End: 2025-01-08
Attending: SURGERY | Admitting: SURGERY
Payer: MEDICAID

## 2025-01-08 VITALS
TEMPERATURE: 97.5 F | BODY MASS INDEX: 21.19 KG/M2 | RESPIRATION RATE: 16 BRPM | HEART RATE: 71 BPM | WEIGHT: 135 LBS | OXYGEN SATURATION: 99 % | HEIGHT: 67 IN | SYSTOLIC BLOOD PRESSURE: 106 MMHG | DIASTOLIC BLOOD PRESSURE: 63 MMHG

## 2025-01-08 DIAGNOSIS — Z12.11 SPECIAL SCREENING FOR MALIGNANT NEOPLASMS, COLON: Primary | ICD-10-CM

## 2025-01-08 PROCEDURE — 999N000010 HC STATISTIC ANES STAT CODE-CRNA PER MINUTE: Performed by: SURGERY

## 2025-01-08 PROCEDURE — G0121 COLON CA SCRN NOT HI RSK IND: HCPCS | Performed by: SURGERY

## 2025-01-08 PROCEDURE — 250N000009 HC RX 250: Performed by: NURSE ANESTHETIST, CERTIFIED REGISTERED

## 2025-01-08 PROCEDURE — 45378 DIAGNOSTIC COLONOSCOPY: CPT | Performed by: SURGERY

## 2025-01-08 PROCEDURE — 258N000003 HC RX IP 258 OP 636: Performed by: SURGERY

## 2025-01-08 PROCEDURE — 250N000011 HC RX IP 250 OP 636: Performed by: NURSE ANESTHETIST, CERTIFIED REGISTERED

## 2025-01-08 RX ORDER — ONDANSETRON 2 MG/ML
4 INJECTION INTRAMUSCULAR; INTRAVENOUS EVERY 6 HOURS PRN
Status: DISCONTINUED | OUTPATIENT
Start: 2025-01-08 | End: 2025-01-08 | Stop reason: HOSPADM

## 2025-01-08 RX ORDER — NALOXONE HYDROCHLORIDE 0.4 MG/ML
0.2 INJECTION, SOLUTION INTRAMUSCULAR; INTRAVENOUS; SUBCUTANEOUS
Status: DISCONTINUED | OUTPATIENT
Start: 2025-01-08 | End: 2025-01-08 | Stop reason: HOSPADM

## 2025-01-08 RX ORDER — PROCHLORPERAZINE MALEATE 5 MG/1
10 TABLET ORAL EVERY 6 HOURS PRN
Status: DISCONTINUED | OUTPATIENT
Start: 2025-01-08 | End: 2025-01-08 | Stop reason: HOSPADM

## 2025-01-08 RX ORDER — LIDOCAINE HYDROCHLORIDE 20 MG/ML
INJECTION, SOLUTION INFILTRATION; PERINEURAL PRN
Status: DISCONTINUED | OUTPATIENT
Start: 2025-01-08 | End: 2025-01-08

## 2025-01-08 RX ORDER — ONDANSETRON 2 MG/ML
4 INJECTION INTRAMUSCULAR; INTRAVENOUS
Status: DISCONTINUED | OUTPATIENT
Start: 2025-01-08 | End: 2025-01-08 | Stop reason: HOSPADM

## 2025-01-08 RX ORDER — LIDOCAINE 40 MG/G
CREAM TOPICAL
Status: DISCONTINUED | OUTPATIENT
Start: 2025-01-08 | End: 2025-01-08 | Stop reason: HOSPADM

## 2025-01-08 RX ORDER — NALOXONE HYDROCHLORIDE 0.4 MG/ML
0.4 INJECTION, SOLUTION INTRAMUSCULAR; INTRAVENOUS; SUBCUTANEOUS
Status: DISCONTINUED | OUTPATIENT
Start: 2025-01-08 | End: 2025-01-08 | Stop reason: HOSPADM

## 2025-01-08 RX ORDER — SODIUM CHLORIDE, SODIUM LACTATE, POTASSIUM CHLORIDE, CALCIUM CHLORIDE 600; 310; 30; 20 MG/100ML; MG/100ML; MG/100ML; MG/100ML
INJECTION, SOLUTION INTRAVENOUS CONTINUOUS
Status: DISCONTINUED | OUTPATIENT
Start: 2025-01-08 | End: 2025-01-08 | Stop reason: HOSPADM

## 2025-01-08 RX ORDER — PROPOFOL 10 MG/ML
INJECTION, EMULSION INTRAVENOUS PRN
Status: DISCONTINUED | OUTPATIENT
Start: 2025-01-08 | End: 2025-01-08

## 2025-01-08 RX ORDER — PROPOFOL 10 MG/ML
INJECTION, EMULSION INTRAVENOUS CONTINUOUS PRN
Status: DISCONTINUED | OUTPATIENT
Start: 2025-01-08 | End: 2025-01-08

## 2025-01-08 RX ORDER — FLUMAZENIL 0.1 MG/ML
0.2 INJECTION, SOLUTION INTRAVENOUS
Status: DISCONTINUED | OUTPATIENT
Start: 2025-01-08 | End: 2025-01-08 | Stop reason: HOSPADM

## 2025-01-08 RX ORDER — ONDANSETRON 4 MG/1
4 TABLET, ORALLY DISINTEGRATING ORAL EVERY 6 HOURS PRN
Status: DISCONTINUED | OUTPATIENT
Start: 2025-01-08 | End: 2025-01-08 | Stop reason: HOSPADM

## 2025-01-08 RX ADMIN — SODIUM CHLORIDE, SODIUM LACTATE, POTASSIUM CHLORIDE, AND CALCIUM CHLORIDE: .6; .31; .03; .02 INJECTION, SOLUTION INTRAVENOUS at 07:28

## 2025-01-08 RX ADMIN — PROPOFOL 100 MG: 10 INJECTION, EMULSION INTRAVENOUS at 07:48

## 2025-01-08 RX ADMIN — LIDOCAINE HYDROCHLORIDE 40 MG: 20 INJECTION, SOLUTION INFILTRATION; PERINEURAL at 07:48

## 2025-01-08 RX ADMIN — PROPOFOL 150 MCG/KG/MIN: 10 INJECTION, EMULSION INTRAVENOUS at 07:48

## 2025-01-08 ASSESSMENT — ACTIVITIES OF DAILY LIVING (ADL)
ADLS_ACUITY_SCORE: 41

## 2025-01-08 NOTE — OP NOTE
PROCEDURE NOTE    SURGEON:Dejah Dominguez MD.    PRE-OP DIAGNOSIS:  Screening Colonoscopy      POST-OP DIAGNOSIS: normal colon    PROCEDURE:  Screening colonoscopy    ESTIMATED BLOODLOSS: none    COMPLICATIONS:  None    SPECIMEN:  none    ANESTHESIA:  See anesthesia note    INDICATION FOR THE PROCEDURE: The patient is a 44 year old female. The patient has no complaints. I explained to the patient the risks, benefits and alternatives to screening colonoscopy for evaluating the colon for colon polyps and colon cancer. We specifically discussed the risks of bleeding, infection, perforation, potential inability to reach the cecum and the risks of sedation. The patient's questions were answered and the patient wished to proceed. Informed consent paperwork was completed.    PROCEDURE: The patient was taken to the endoscopy suite. Appropriate monitors were attached. The patient was placed in the left lateral decubitus position.Timeout was performed confirming the patient's identity and procedure to be performed. After appropriate sedation was confirmed, digital rectal exam was performed. There was normal tone and no gross abnormality was noted. The lubricated colonoscope was introduced into the anus the colon was insufflated with air. The prep quality was adequate. Under direct visualization the scope was advanced to the cecum. The ileocecal valve was intubated and the terminal ileum inspected. No gross abnormality was noted. The scope was withdrawn back into the cecum. The mucosa of colon was inspected while withdrawing the scope. No abnormalities were noted. The scope was retroflexed in the rectum and the anorectal junction was inspected. No abnormalities were noted. The scope was returned to a neutral position and the colon was decompressed. The scope was removed. The patient tolerated the procedure with no immediately apparent complication. The patient was taken to recovery in stable condition.    FOLLOW UP:RECOMMEND  high fiber diet, follow up: 10 year screening colonoscopy.

## 2025-01-08 NOTE — ANESTHESIA PREPROCEDURE EVALUATION
Anesthesia Pre-Procedure Evaluation    Patient: Kya Herring   MRN: [id number] : 1980        Procedure : Procedure(s):  Colonoscopy          Past Medical History:   Diagnosis Date     Gestational thrombocytopenia      Vaginal delivery     x4      Past Surgical History:   Procedure Laterality Date     AS LAP INCISIONAL HERNIA REPAIR  2017,13371.0,[place] REPAIR INCISIONAL HERNIA REDUCIBLE     REMOVE FISTULA ANAL  2015    ,Dr Rodrigues      Allergies   Allergen Reactions     Penicillins Hives      Social History     Tobacco Use     Smoking status: Never     Passive exposure: Never     Smokeless tobacco: Never   Substance Use Topics     Alcohol use: No      Wt Readings from Last 1 Encounters:   24 63.4 kg (139 lb 12.8 oz)        Anesthesia Evaluation   Pt has had prior anesthetic.     No history of anesthetic complications       ROS/MED HX  ENT/Pulmonary:       Neurologic:       Cardiovascular:       METS/Exercise Tolerance: >4 METS    Hematologic:       Musculoskeletal:       GI/Hepatic:     (+)        bowel prep,            Renal/Genitourinary:       Endo:       Psychiatric/Substance Use:       Infectious Disease:       Malignancy:       Other:            Physical Exam    Airway  airway exam normal      Mallampati: II   TM distance: > 3 FB   Neck ROM: full   Mouth opening: > 3 cm    Respiratory Devices and Support         Dental       (+) Modest Abnormalities - crowns, retainers, 1 or 2 missing teeth      Cardiovascular   cardiovascular exam normal       Rhythm and rate: regular and normal     Pulmonary   pulmonary exam normal        breath sounds clear to auscultation       OUTSIDE LABS:  CBC:   Lab Results   Component Value Date    WBC 5.7 2024    WBC 3.7 (L) 2024    HGB 12.8 2024    HGB 11.0 (L) 2024    HCT 39.6 2024    HCT 35.4 2024     2024     2024     BMP:   Lab Results   Component Value Date     2023    NA  "136 03/25/2022    POTASSIUM 4.3 03/29/2023    POTASSIUM 3.7 03/25/2022    CHLORIDE 106 03/29/2023    CHLORIDE 106 03/25/2022    CO2 28 03/29/2023    CO2 28 03/25/2022    BUN 14.2 03/29/2023    BUN 12 03/25/2022    CR 0.70 03/29/2023    CR 0.73 03/25/2022    GLC 84 03/29/2023    GLC 96 03/25/2022     COAGS: No results found for: \"PTT\", \"INR\", \"FIBR\"  POC:   Lab Results   Component Value Date    HCG Negative 12/22/2017     HEPATIC:   Lab Results   Component Value Date    ALBUMIN 4.2 03/29/2023    PROTTOTAL 7.2 03/29/2023    ALT 7 (L) 03/29/2023    AST 14 03/29/2023    ALKPHOS 55 03/29/2023    BILITOTAL 0.4 03/29/2023     OTHER:   Lab Results   Component Value Date    EVANGELINA 8.9 03/29/2023    MAG 2.0 03/29/2023    TSH 2.75 03/29/2023       Anesthesia Plan    ASA Status:  1    NPO Status:  NPO Appropriate    Anesthesia Type: MAC.     - Reason for MAC: straight local not clinically adequate   Induction: Intravenous.   Maintenance: Balanced.        Consents    Anesthesia Plan(s) and associated risks, benefits, and realistic alternatives discussed. Questions answered and patient/representative(s) expressed understanding.     - Discussed: Risks, Benefits and Alternatives for BOTH SEDATION and the PROCEDURE were discussed     - Discussed with:  Patient      - Extended Intubation/Ventilatory Support Discussed: No.      - Patient is DNR/DNI Status: No     Use of blood products discussed: No .     Postoperative Care            Comments:    Other Comments: Risks, benefits and alternatives discussed and would like to proceed. General anesthesia ok if indicated.              Ranjith Neville, APRN CRNA    I have reviewed the pertinent notes and labs in the chart from the past 30 days and (re)examined the patient.  Any updates or changes from those notes are reflected in this note.                                   "

## 2025-01-08 NOTE — ANESTHESIA POSTPROCEDURE EVALUATION
Patient: Kya Herring    Procedure: Procedure(s):  Colonoscopy       Anesthesia Type:  MAC    Note:  Disposition: Outpatient   Postop Pain Control: Uneventful            Sign Out: Well controlled pain   PONV: No   Neuro/Psych: Uneventful            Sign Out: Acceptable/Baseline neuro status   Airway/Respiratory: Uneventful            Sign Out: Acceptable/Baseline resp. status   CV/Hemodynamics: Uneventful            Sign Out: Acceptable CV status; No obvious hypovolemia; No obvious fluid overload   Other NRE: NONE   DID A NON-ROUTINE EVENT OCCUR? No       Last vitals:  Vitals Value Taken Time   /63 01/08/25 0930   Temp 97.5  F (36.4  C) 01/08/25 0830   Pulse 71 01/08/25 0930   Resp 16 01/08/25 0845   SpO2 98 % 01/08/25 0936   Vitals shown include unfiled device data.    Electronically Signed By: SAULO Aguillon CRNA  January 8, 2025  11:32 AM

## 2025-01-08 NOTE — H&P
"PRE-PROCEDURE NOTE    CHIEF COMPLAINT / REASON FORPROCEDURE:  Need for screening colonoscopy.    PERTINENT HISTORY   Patient with no complaints. Previous colonoscopy none. No diarrhea, constipation, abdominal pain or rectal bleeding. No family history of colon polyps or colon cancer.  Past Medical History:   Diagnosis Date    Gestational thrombocytopenia     Vaginal delivery     x4     Past Surgical History:   Procedure Laterality Date    AS LAP INCISIONAL HERNIA REPAIR  12/22/2017 12/22/2017,99222.0,[place] REPAIR INCISIONAL HERNIA REDUCIBLE    REMOVE FISTULA ANAL  05/2015    ,Dr Rodrigues     Other:  None  Bleeding tendencies:  No    Relevant Family History:  none    Relevant Social History:  none    A relevant review of systems was performed and was Negative.    ALLERGIES/SENSITIVITIES:   Allergies   Allergen Reactions    Penicillins Hives        Current Facility-Administered Medications   Medication Dose Route Frequency Provider Last Rate Last Admin    lactated ringers infusion   Intravenous Continuous Dejah Dominguez MD 30 mL/hr at 01/08/25 0728 New Bag at 01/08/25 0728    lidocaine (LMX4) cream   Topical Q1H PRN Dejah Dominguez MD        lidocaine 1 % 0.1-1 mL  0.1-1 mL Other Q1H PRN Dejah Dominguez MD        ondansetron (ZOFRAN) injection 4 mg  4 mg Intravenous Once PRN Dejah Dominguez MD        sodium chloride (PF) 0.9% PF flush 3 mL  3 mL Intracatheter Q8H Dejah Dominguez MD        sodium chloride (PF) 0.9% PF flush 3 mL  3 mL Intracatheter q1 min prn Dejah Dominguez MD           PRE-SEDATION ASSESSMENT:    /73   Pulse 82   Temp 98.6  F (37  C) (Tympanic)   Resp 16   Ht 1.702 m (5' 7\")   Wt 61.2 kg (135 lb)   LMP 12/19/2024   SpO2 100%   BMI 21.14 kg/m    Lung Exam:  Normal  Heart Exam:  Normal    Comment(s):      IMPRESSION:  Need for screening colonoscopy.    PLAN:  I discussed screening colonoscopy with the patient.       "

## 2025-01-08 NOTE — ANESTHESIA CARE TRANSFER NOTE
Patient: Kya Herring    Procedure: Procedure(s):  Colonoscopy       Diagnosis: Colon cancer screening [Z12.11]  Diagnosis Additional Information: No value filed.    Anesthesia Type:   MAC     Note:    Oropharynx: oropharynx clear of all foreign objects  Level of Consciousness: awake  Oxygen Supplementation: room air    Independent Airway: airway patency satisfactory and stable  Dentition: dentition unchanged  Vital Signs Stable: post-procedure vital signs reviewed and stable  Report to RN Given: handoff report given  Patient transferred to: Phase II    Handoff Report: Identifed the Patient, Identified the Reponsible Provider, Reviewed the pertinent medical history, Discussed the surgical course, Reviewed Intra-OP anesthesia mangement and issues during anesthesia, Set expectations for post-procedure period and Allowed opportunity for questions and acknowledgement of understanding      Vitals:  Vitals Value Taken Time   BP     Temp     Pulse     Resp     SpO2         Electronically Signed By: SAULO JACOBS CRNA  January 8, 2025  8:25 AM

## 2025-01-08 NOTE — DISCHARGE INSTRUCTIONS
Procedure you had done: normal colon on screening  Your health care provider is:  Catherine Lopez  Your surgeon is Dr. Dejah Dominguez.   Please call your health care provider or surgeon at [phone] if:    - you feel you are getting worse or having an increase in problems    - fever greater than 101 degrees  - increasing shortness of breath or chest pain  - any signs of infection (increasing redness, swelling, tenderness, warmth, change in appearance, or  increased drainage)  - blood in your urine or stool  - coughing or vomiting blood  - nausea (upset stomach) and vomiting and/or diarrhea that will not stop  - severe pain that is not relieved by medicine, rest or ice  You have had medications for sedation. Please be aware that this can cause drowsiness and impaired judgment for up to 24 hours after your procedure. Do not drive, operate power tools or drink alcohol for 24 hours.  If samples were taken-you will get a phone call and a letter with your results in the next 7-10 days. If you don't get results, please call and let us know!     [facility]  Adult Discharge Orders & Instructions      ________________________________________________________________            For 12 hours after surgery  Get plenty of rest.  A responsible adult must stay with you for at least 12 hours after you leave the hospital.   You may feel lightheaded.  IF so, sit for a few minutes before standing.  Have someone help you get up.   You may have a slight fever. Call the doctor if your fever is over 101 F (38.3 C) (taken under the tongue) or lasts longer than 24 hours.  You may have a dry mouth, a sore throat, muscle aches or trouble sleeping.  These should go away after 24 hours.  Do not make important or legal decisions.  6.   Do not drive or use heavy equipment.  If you have weakness or tingling, don't drive or use heavy equipment until this feeling goes away.    To contact a doctor, call   [phone]

## 2025-01-08 NOTE — OR NURSING
Pt has been discharged to home at 0945 via ambulatory accompanied by , Clifford.    Written discharge instructions were provided to patient.  Prescriptions were NA.  Patient states their pain is none, and denies any nausea or dizziness upon discharge.    Patient verbalize understanding of discharge instructions including no driving until tomorrow and no longer taking narcotic pain medications, no operating mechanical equipment, and no making any important decisions.They understand reason for discharge, and necessary follow-up appointments.  Shelbie Martínez RN on 1/8/2025 at 9:58 AM

## 2025-06-04 ENCOUNTER — HOSPITAL ENCOUNTER (OUTPATIENT)
Dept: MAMMOGRAPHY | Facility: OTHER | Age: 45
Discharge: HOME | End: 2025-06-04
Attending: FAMILY MEDICINE
Payer: MEDICAID

## 2025-06-04 DIAGNOSIS — Z12.31 VISIT FOR SCREENING MAMMOGRAM: ICD-10-CM

## 2025-06-04 PROCEDURE — 77063 BREAST TOMOSYNTHESIS BI: CPT

## 2025-06-04 PROCEDURE — 77067 SCR MAMMO BI INCL CAD: CPT | Mod: 26 | Performed by: STUDENT IN AN ORGANIZED HEALTH CARE EDUCATION/TRAINING PROGRAM

## 2025-06-04 PROCEDURE — 77063 BREAST TOMOSYNTHESIS BI: CPT | Mod: 26 | Performed by: STUDENT IN AN ORGANIZED HEALTH CARE EDUCATION/TRAINING PROGRAM

## 2025-09-01 ENCOUNTER — PATIENT OUTREACH (OUTPATIENT)
Dept: CARE COORDINATION | Facility: CLINIC | Age: 45
End: 2025-09-01
Payer: COMMERCIAL

## (undated) DEVICE — ENDO BRUSH CHANNEL MASTER CLEANING 2-4.2MM BW-412T

## (undated) DEVICE — SOL WATER 1500ML

## (undated) DEVICE — ENDO KIT COMPLIANCE DYKENDOCMPLY

## (undated) DEVICE — TUBING SUCTION 10'X3/16" N510

## (undated) DEVICE — SUCTION MANIFOLD NEPTUNE 2 SYS 4 PORT 0702-020-000

## (undated) RX ORDER — PROPOFOL 10 MG/ML
INJECTION, EMULSION INTRAVENOUS
Status: DISPENSED
Start: 2025-01-08